# Patient Record
Sex: MALE | Race: WHITE | NOT HISPANIC OR LATINO | Employment: FULL TIME | URBAN - METROPOLITAN AREA
[De-identification: names, ages, dates, MRNs, and addresses within clinical notes are randomized per-mention and may not be internally consistent; named-entity substitution may affect disease eponyms.]

---

## 2021-01-08 ENCOUNTER — IMMUNIZATION (OUTPATIENT)
Dept: VACCINE CLINIC | Facility: HOSPITAL | Age: 21
End: 2021-01-08

## 2021-01-08 PROCEDURE — 91301 HC SARSCO02 VAC 100MCG/0.5ML IM: CPT | Performed by: OBSTETRICS & GYNECOLOGY

## 2021-01-08 PROCEDURE — 0011A: CPT | Performed by: OBSTETRICS & GYNECOLOGY

## 2021-02-08 ENCOUNTER — IMMUNIZATION (OUTPATIENT)
Dept: VACCINE CLINIC | Facility: HOSPITAL | Age: 21
End: 2021-02-08

## 2021-02-08 PROCEDURE — 91301 HC SARSCO02 VAC 100MCG/0.5ML IM: CPT | Performed by: OBSTETRICS & GYNECOLOGY

## 2021-02-08 PROCEDURE — 0012A: CPT | Performed by: OBSTETRICS & GYNECOLOGY

## 2021-12-09 ENCOUNTER — IMMUNIZATION (OUTPATIENT)
Dept: VACCINE CLINIC | Facility: HOSPITAL | Age: 21
End: 2021-12-09

## 2021-12-09 DIAGNOSIS — Z23 NEED FOR VACCINATION: Primary | ICD-10-CM

## 2021-12-09 PROCEDURE — 91306 HC SARSCOV2 VAC 50MCG/0.25ML IM: CPT | Performed by: OBSTETRICS & GYNECOLOGY

## 2021-12-09 PROCEDURE — 0064A HC ADM SARSCOV2 50MCG/0.25ML BOOSTER: CPT | Performed by: OBSTETRICS & GYNECOLOGY

## 2022-08-09 NOTE — PROGRESS NOTES
"SURGERY  Wild Herrera   2000  08/10/22    Chief Complaint: Hernia    HPI    Mr. Herrera is a very nice 22 y.o. male referred by Dr Roe in the ER with a left inguinal hernia.  He had gone to the urgent care for right testicular pain of about 1 month duration, onset coinciding with lifting a washer.  he does heavy lifting at his work for [employer].  During his examination and urgent care he actually vasovagal than sustained a lumbar bruise and was directed to the ER.  He went to [facility] ER where he was determined to have a reducible left inguinal hernia.  Testicular US was negative.  UA was negative as well.    Father with hernia as a child.      Today, he doesn't bring it up, nor is it on his ROS, but when questioned, he states he has rectal pain as well, but no bleeding and non sense of constipation.  He adds that it hurts to even get up out of the chair.  His mother notes he is a \"non-complainer\" and he states it is bad enough that he would like to consider not working.      Past Medical History:   Diagnosis Date   • Asthma NA    Mild Exercise induced asthma. Had issues with it playing sports in high school but havent had any issues since.     Past Surgical History:   Procedure Laterality Date   • CATARACT EXTRACTION     • KNEE SURGERY       Family History   Problem Relation Age of Onset   • Hypertension Mother    • Kidney disease Mother    • No Known Problems Father    • Depression Sister      Social History     Socioeconomic History   • Marital status: Single   Tobacco Use   • Smoking status: Never Smoker   • Smokeless tobacco: Never Used   Vaping Use   • Vaping Use: Never used   Substance and Sexual Activity   • Alcohol use: Yes     Comment: social    • Drug use: Never   • Sexual activity: Never       No current outpatient medications on file.    Allergies   Allergen Reactions   • Cefprozil Rash and Unknown (See Comments)     Review of Systems  No chest pain or SOB.  All other systems reviewed and " "negative.     PHYSICAL EXAM:    Ht 172.7 cm (68\")   Wt 79.2 kg (174 lb 8 oz)   BMI 26.53 kg/m²   Body mass index is 26.53 kg/m².    Constitutional: well developed, well nourished, appears  healthy, stated age  ENMT: Hearing intact, neck without masses  CVS: RRR, no murmur, no peripheral edema  Respiratory: CTA, normal respiratory effort   Gastrointestinal: abdomen soft, nontender, abdominal hernia not present  Genitourinary: inguinal hernia felt on the left with palpable protruding tissue with coughing that then recedes, a sense of a pulsation on the right without obvious hernia, very tender testicles posteriorly, tender cord up high bilaterlal  Musculoskeletal: gait normal, muscle mass normal  Neurological: awake and alert, seems to have reasonable capacity for understanding for medical decision making  Psychiatric: appears to have reasonable judgement, pleasant, quiet    Radiographic/Lab Findings:   US testicle    Reviewed: results from [facility] and pamphlet regarding surgery for hernia.    IMPRESSION:  · Right epididymitis seems possible despite negative US  · Left inguinal hernia  · Possible right inguinal hernia  · Testicular pain out of proportion to exam, associated with lifting.  Mother is a little concerned about needing additional evaluation since no clear etiology of pain which is fairly significant in a \"non-complainer\"  · Rectal pain    PLAN:  · CT pelvis to evaluate pain further  · Initiate tx for possible epididymitis.  cipro (instructed to hold if develops ligamentous pain) 10 days and advil  · Schedule lap left inguinal hernia repair, possible open.  Discussed risks of recurrence but mostly the almost certain post op testicular pain from irritation of the cord.  · If pain completely resolves with cipro and advil, may call and cancel surgery until hernia more symptomatic.   · Kefzol, oxytonin, celebrex, tylenol, SCD's in holding.  · Off work until surgery.     Veronica Danielle MD  14:56 EDT      In " order to provide a more personal and interactive patient experience as well as improve efficiency, this note was started prior to the office visit, including review of past history and pertinent images, surgeries.    ADDEND  CT normal.  I don't see clear evidence of hernias, and it was not read as such, but the inguinal canal looks a little billowy (thus could be hernias).  Proceed with plans as above.   Staff to notify.   Veronica Danielle MD  08/15/22

## 2022-08-09 NOTE — H&P (VIEW-ONLY)
"SURGERY  Wild Herrera   2000  08/10/22    Chief Complaint: Hernia    HPI    Mr. Herrera is a very nice 22 y.o. male referred by Dr Roe in the ER with a left inguinal hernia.  He had gone to the urgent care for right testicular pain of about 1 month duration, onset coinciding with lifting a washer.  he does heavy lifting at his work for [employer].  During his examination and urgent care he actually vasovagal than sustained a lumbar bruise and was directed to the ER.  He went to [facility] ER where he was determined to have a reducible left inguinal hernia.  Testicular US was negative.  UA was negative as well.    Father with hernia as a child.      Today, he doesn't bring it up, nor is it on his ROS, but when questioned, he states he has rectal pain as well, but no bleeding and non sense of constipation.  He adds that it hurts to even get up out of the chair.  His mother notes he is a \"non-complainer\" and he states it is bad enough that he would like to consider not working.      Past Medical History:   Diagnosis Date   • Asthma NA    Mild Exercise induced asthma. Had issues with it playing sports in high school but havent had any issues since.     Past Surgical History:   Procedure Laterality Date   • CATARACT EXTRACTION     • KNEE SURGERY       Family History   Problem Relation Age of Onset   • Hypertension Mother    • Kidney disease Mother    • No Known Problems Father    • Depression Sister      Social History     Socioeconomic History   • Marital status: Single   Tobacco Use   • Smoking status: Never Smoker   • Smokeless tobacco: Never Used   Vaping Use   • Vaping Use: Never used   Substance and Sexual Activity   • Alcohol use: Yes     Comment: social    • Drug use: Never   • Sexual activity: Never       No current outpatient medications on file.    Allergies   Allergen Reactions   • Cefprozil Rash and Unknown (See Comments)     Review of Systems  No chest pain or SOB.  All other systems reviewed and " "negative.     PHYSICAL EXAM:    Ht 172.7 cm (68\")   Wt 79.2 kg (174 lb 8 oz)   BMI 26.53 kg/m²   Body mass index is 26.53 kg/m².    Constitutional: well developed, well nourished, appears  healthy, stated age  ENMT: Hearing intact, neck without masses  CVS: RRR, no murmur, no peripheral edema  Respiratory: CTA, normal respiratory effort   Gastrointestinal: abdomen soft, nontender, abdominal hernia not present  Genitourinary: inguinal hernia felt on the left with palpable protruding tissue with coughing that then recedes, a sense of a pulsation on the right without obvious hernia, very tender testicles posteriorly, tender cord up high bilaterlal  Musculoskeletal: gait normal, muscle mass normal  Neurological: awake and alert, seems to have reasonable capacity for understanding for medical decision making  Psychiatric: appears to have reasonable judgement, pleasant, quiet    Radiographic/Lab Findings:   US testicle    Reviewed: results from [facility] and pamphlet regarding surgery for hernia.    IMPRESSION:  · Right epididymitis seems possible despite negative US  · Left inguinal hernia  · Possible right inguinal hernia  · Testicular pain out of proportion to exam, associated with lifting.  Mother is a little concerned about needing additional evaluation since no clear etiology of pain which is fairly significant in a \"non-complainer\"  · Rectal pain    PLAN:  · CT pelvis to evaluate pain further  · Initiate tx for possible epididymitis.  cipro (instructed to hold if develops ligamentous pain) 10 days and advil  · Schedule lap left inguinal hernia repair, possible open.  Discussed risks of recurrence but mostly the almost certain post op testicular pain from irritation of the cord.  · If pain completely resolves with cipro and advil, may call and cancel surgery until hernia more symptomatic.   · Kefzol, oxytonin, celebrex, tylenol, SCD's in holding.  · Off work until surgery.     Veronica Danielle MD  14:56 EDT      In " order to provide a more personal and interactive patient experience as well as improve efficiency, this note was started prior to the office visit, including review of past history and pertinent images, surgeries.    ADDEND  CT normal.  I don't see clear evidence of hernias, and it was not read as such, but the inguinal canal looks a little billowy (thus could be hernias).  Proceed with plans as above.   Staff to notify.   Veronica Danielle MD  08/15/22

## 2022-08-10 ENCOUNTER — PREP FOR SURGERY (OUTPATIENT)
Dept: OTHER | Facility: HOSPITAL | Age: 22
End: 2022-08-10

## 2022-08-10 ENCOUNTER — OFFICE VISIT (OUTPATIENT)
Dept: SURGERY | Facility: CLINIC | Age: 22
End: 2022-08-10

## 2022-08-10 VITALS — HEIGHT: 68 IN | BODY MASS INDEX: 26.45 KG/M2 | WEIGHT: 174.5 LBS

## 2022-08-10 DIAGNOSIS — K40.90 LEFT INGUINAL HERNIA: Primary | ICD-10-CM

## 2022-08-10 DIAGNOSIS — N45.1 EPIDIDYMITIS: ICD-10-CM

## 2022-08-10 PROCEDURE — 99203 OFFICE O/P NEW LOW 30 MIN: CPT | Performed by: SURGERY

## 2022-08-10 RX ORDER — CELECOXIB 200 MG/1
200 CAPSULE ORAL ONCE
Status: CANCELLED | OUTPATIENT
Start: 2022-08-24 | End: 2022-08-10

## 2022-08-10 RX ORDER — CLINDAMYCIN PHOSPHATE 900 MG/50ML
900 INJECTION INTRAVENOUS ONCE
Status: CANCELLED | OUTPATIENT
Start: 2022-08-24 | End: 2022-08-10

## 2022-08-10 RX ORDER — CIPROFLOXACIN 500 MG/1
500 TABLET, FILM COATED ORAL 2 TIMES DAILY
Qty: 20 TABLET | Refills: 0 | Status: SHIPPED | OUTPATIENT
Start: 2022-08-10 | End: 2022-08-24 | Stop reason: HOSPADM

## 2022-08-10 RX ORDER — ONDANSETRON 2 MG/ML
4 INJECTION INTRAMUSCULAR; INTRAVENOUS EVERY 6 HOURS PRN
Status: CANCELLED | OUTPATIENT
Start: 2022-08-10

## 2022-08-10 RX ORDER — OXYCODONE HCL 10 MG/1
10 TABLET, FILM COATED, EXTENDED RELEASE ORAL ONCE
Status: CANCELLED | OUTPATIENT
Start: 2022-08-24 | End: 2022-08-10

## 2022-08-10 RX ORDER — ACETAMINOPHEN 500 MG
1000 TABLET ORAL ONCE
Status: CANCELLED | OUTPATIENT
Start: 2022-08-24 | End: 2022-08-10

## 2022-08-12 ENCOUNTER — HOSPITAL ENCOUNTER (OUTPATIENT)
Dept: CT IMAGING | Facility: HOSPITAL | Age: 22
Discharge: HOME | End: 2022-08-12
Admitting: SURGERY

## 2022-08-12 DIAGNOSIS — K40.90 LEFT INGUINAL HERNIA: ICD-10-CM

## 2022-08-12 DIAGNOSIS — N45.1 EPIDIDYMITIS: ICD-10-CM

## 2022-08-12 PROCEDURE — 72193 CT PELVIS W/DYE: CPT

## 2022-08-12 PROCEDURE — 25010000002 IOPAMIDOL 61 % SOLUTION: Performed by: SURGERY

## 2022-08-12 RX ADMIN — IOPAMIDOL 100 ML: 612 INJECTION, SOLUTION INTRAVENOUS at 14:17

## 2022-08-15 ENCOUNTER — APPOINTMENT (OUTPATIENT)
Dept: CT IMAGING | Facility: HOSPITAL | Age: 22
End: 2022-08-15

## 2022-08-15 ENCOUNTER — TELEPHONE (OUTPATIENT)
Dept: SURGERY | Facility: CLINIC | Age: 22
End: 2022-08-15

## 2022-08-15 NOTE — TELEPHONE ENCOUNTER
OK PER [facility] VERBAL RELEASE    I left the patient a voicemail per Dr. Danielle with the results from his CT scan:  CT normal.  I don't see clear evidence of hernias, and it was not read as such, but the inguinal canal looks a little billowy (thus could be hernias).  Proceed with plans as above.   Staff to notify.   Veronica Danielle MD  08/15/22    Advised patient to call back with any questions.

## 2022-08-15 NOTE — TELEPHONE ENCOUNTER
----- Message from Veronica Danielle MD sent at 8/15/2022  7:11 AM EDT -----  Please note my addendum to most recent office visit and call to notify patient.

## 2022-08-23 ENCOUNTER — LAB (OUTPATIENT)
Dept: LAB | Facility: HOSPITAL | Age: 22
End: 2022-08-23

## 2022-08-23 DIAGNOSIS — K40.90 LEFT INGUINAL HERNIA: ICD-10-CM

## 2022-08-23 LAB — SARS-COV-2 ORF1AB RESP QL NAA+PROBE: NOT DETECTED

## 2022-08-23 PROCEDURE — C9803 HOPD COVID-19 SPEC COLLECT: HCPCS

## 2022-08-23 PROCEDURE — U0004 COV-19 TEST NON-CDC HGH THRU: HCPCS

## 2022-08-23 RX ORDER — IBUPROFEN 400 MG/1
400 TABLET ORAL EVERY 6 HOURS PRN
COMMUNITY
End: 2022-08-24 | Stop reason: HOSPADM

## 2022-08-24 ENCOUNTER — ANESTHESIA (OUTPATIENT)
Dept: PERIOP | Facility: HOSPITAL | Age: 22
End: 2022-08-24

## 2022-08-24 ENCOUNTER — HOSPITAL ENCOUNTER (OUTPATIENT)
Facility: HOSPITAL | Age: 22
Setting detail: HOSPITAL OUTPATIENT SURGERY
Discharge: HOME | End: 2022-08-24
Attending: SURGERY | Admitting: SURGERY

## 2022-08-24 ENCOUNTER — ANESTHESIA EVENT (OUTPATIENT)
Dept: PERIOP | Facility: HOSPITAL | Age: 22
End: 2022-08-24

## 2022-08-24 VITALS
HEART RATE: 80 BPM | TEMPERATURE: 98.3 F | OXYGEN SATURATION: 98 % | RESPIRATION RATE: 16 BRPM | SYSTOLIC BLOOD PRESSURE: 118 MMHG | DIASTOLIC BLOOD PRESSURE: 68 MMHG

## 2022-08-24 DIAGNOSIS — K40.90 LEFT INGUINAL HERNIA: ICD-10-CM

## 2022-08-24 PROCEDURE — 25010000002 HYDROMORPHONE PER 4 MG: Performed by: NURSE ANESTHETIST, CERTIFIED REGISTERED

## 2022-08-24 PROCEDURE — 49650 LAP ING HERNIA REPAIR INIT: CPT | Performed by: SURGERY

## 2022-08-24 PROCEDURE — 25010000002 ONDANSETRON PER 1 MG: Performed by: NURSE ANESTHETIST, CERTIFIED REGISTERED

## 2022-08-24 PROCEDURE — 25010000002 DEXAMETHASONE PER 1 MG: Performed by: NURSE ANESTHETIST, CERTIFIED REGISTERED

## 2022-08-24 PROCEDURE — 25010000002 FENTANYL CITRATE (PF) 50 MCG/ML SOLUTION: Performed by: NURSE ANESTHETIST, CERTIFIED REGISTERED

## 2022-08-24 PROCEDURE — 25010000002 KETOROLAC TROMETHAMINE PER 15 MG: Performed by: NURSE ANESTHETIST, CERTIFIED REGISTERED

## 2022-08-24 PROCEDURE — C1781 MESH (IMPLANTABLE): HCPCS | Performed by: SURGERY

## 2022-08-24 PROCEDURE — 25010000002 PROPOFOL 10 MG/ML EMULSION: Performed by: NURSE ANESTHETIST, CERTIFIED REGISTERED

## 2022-08-24 PROCEDURE — 49650 LAP ING HERNIA REPAIR INIT: CPT | Performed by: PHYSICIAN ASSISTANT

## 2022-08-24 PROCEDURE — 25010000002 NEOSTIGMINE 5 MG/10ML SOLUTION: Performed by: NURSE ANESTHETIST, CERTIFIED REGISTERED

## 2022-08-24 PROCEDURE — 25010000002 MIDAZOLAM PER 1 MG: Performed by: ANESTHESIOLOGY

## 2022-08-24 DEVICE — HEMOLOK ML 6 CLIPS/CART
Type: IMPLANTABLE DEVICE | Site: ABDOMEN | Status: FUNCTIONAL
Brand: WECK

## 2022-08-24 DEVICE — BARD 3DMAX MESH RIGHT LARGE
Type: IMPLANTABLE DEVICE | Site: ABDOMEN | Status: FUNCTIONAL
Brand: BARD 3DMAX MESH

## 2022-08-24 DEVICE — BARD 3DMAX MESH LEFT LARGE
Type: IMPLANTABLE DEVICE | Site: ABDOMEN | Status: FUNCTIONAL
Brand: BARD 3DMAX MESH

## 2022-08-24 DEVICE — FIXATION DEVICE;30 VIOLET ABSORBABLE TACKS
Type: IMPLANTABLE DEVICE | Site: ABDOMEN | Status: FUNCTIONAL
Brand: ABSORBATACK

## 2022-08-24 RX ORDER — HYDROMORPHONE HYDROCHLORIDE 1 MG/ML
0.5 INJECTION, SOLUTION INTRAMUSCULAR; INTRAVENOUS; SUBCUTANEOUS
Status: DISCONTINUED | OUTPATIENT
Start: 2022-08-24 | End: 2022-08-24 | Stop reason: HOSPADM

## 2022-08-24 RX ORDER — ONDANSETRON 2 MG/ML
INJECTION INTRAMUSCULAR; INTRAVENOUS AS NEEDED
Status: DISCONTINUED | OUTPATIENT
Start: 2022-08-24 | End: 2022-08-24 | Stop reason: SURG

## 2022-08-24 RX ORDER — HYDRALAZINE HYDROCHLORIDE 20 MG/ML
5 INJECTION INTRAMUSCULAR; INTRAVENOUS
Status: DISCONTINUED | OUTPATIENT
Start: 2022-08-24 | End: 2022-08-24 | Stop reason: HOSPADM

## 2022-08-24 RX ORDER — NALOXONE HCL 0.4 MG/ML
0.2 VIAL (ML) INJECTION AS NEEDED
Status: DISCONTINUED | OUTPATIENT
Start: 2022-08-24 | End: 2022-08-24 | Stop reason: HOSPADM

## 2022-08-24 RX ORDER — PROMETHAZINE HYDROCHLORIDE 25 MG/1
25 TABLET ORAL ONCE AS NEEDED
Status: DISCONTINUED | OUTPATIENT
Start: 2022-08-24 | End: 2022-08-24 | Stop reason: HOSPADM

## 2022-08-24 RX ORDER — PROMETHAZINE HYDROCHLORIDE 25 MG/1
25 SUPPOSITORY RECTAL ONCE AS NEEDED
Status: DISCONTINUED | OUTPATIENT
Start: 2022-08-24 | End: 2022-08-24 | Stop reason: HOSPADM

## 2022-08-24 RX ORDER — GLYCOPYRROLATE 0.2 MG/ML
INJECTION INTRAMUSCULAR; INTRAVENOUS AS NEEDED
Status: DISCONTINUED | OUTPATIENT
Start: 2022-08-24 | End: 2022-08-24 | Stop reason: SURG

## 2022-08-24 RX ORDER — SODIUM CHLORIDE 0.9 % (FLUSH) 0.9 %
3 SYRINGE (ML) INJECTION EVERY 12 HOURS SCHEDULED
Status: DISCONTINUED | OUTPATIENT
Start: 2022-08-24 | End: 2022-08-24 | Stop reason: HOSPADM

## 2022-08-24 RX ORDER — HYDROCODONE BITARTRATE AND ACETAMINOPHEN 7.5; 325 MG/1; MG/1
1 TABLET ORAL ONCE AS NEEDED
Status: COMPLETED | OUTPATIENT
Start: 2022-08-24 | End: 2022-08-24

## 2022-08-24 RX ORDER — LIDOCAINE HYDROCHLORIDE 10 MG/ML
0.5 INJECTION, SOLUTION EPIDURAL; INFILTRATION; INTRACAUDAL; PERINEURAL ONCE AS NEEDED
Status: DISCONTINUED | OUTPATIENT
Start: 2022-08-24 | End: 2022-08-24 | Stop reason: HOSPADM

## 2022-08-24 RX ORDER — SODIUM CHLORIDE 9 MG/ML
INJECTION, SOLUTION INTRAVENOUS AS NEEDED
Status: DISCONTINUED | OUTPATIENT
Start: 2022-08-24 | End: 2022-08-24 | Stop reason: HOSPADM

## 2022-08-24 RX ORDER — LIDOCAINE HYDROCHLORIDE 20 MG/ML
INJECTION, SOLUTION INFILTRATION; PERINEURAL AS NEEDED
Status: DISCONTINUED | OUTPATIENT
Start: 2022-08-24 | End: 2022-08-24 | Stop reason: SURG

## 2022-08-24 RX ORDER — DIPHENHYDRAMINE HYDROCHLORIDE 50 MG/ML
12.5 INJECTION INTRAMUSCULAR; INTRAVENOUS
Status: DISCONTINUED | OUTPATIENT
Start: 2022-08-24 | End: 2022-08-24 | Stop reason: HOSPADM

## 2022-08-24 RX ORDER — HYDROMORPHONE HCL 110MG/55ML
PATIENT CONTROLLED ANALGESIA SYRINGE INTRAVENOUS AS NEEDED
Status: DISCONTINUED | OUTPATIENT
Start: 2022-08-24 | End: 2022-08-24 | Stop reason: SURG

## 2022-08-24 RX ORDER — DIPHENHYDRAMINE HCL 25 MG
25 CAPSULE ORAL
Status: DISCONTINUED | OUTPATIENT
Start: 2022-08-24 | End: 2022-08-24 | Stop reason: HOSPADM

## 2022-08-24 RX ORDER — OXYCODONE AND ACETAMINOPHEN 7.5; 325 MG/1; MG/1
1 TABLET ORAL EVERY 4 HOURS PRN
Status: DISCONTINUED | OUTPATIENT
Start: 2022-08-24 | End: 2022-08-24 | Stop reason: HOSPADM

## 2022-08-24 RX ORDER — FENTANYL CITRATE 50 UG/ML
50 INJECTION, SOLUTION INTRAMUSCULAR; INTRAVENOUS
Status: DISCONTINUED | OUTPATIENT
Start: 2022-08-24 | End: 2022-08-24 | Stop reason: HOSPADM

## 2022-08-24 RX ORDER — OXYCODONE HCL 10 MG/1
10 TABLET, FILM COATED, EXTENDED RELEASE ORAL ONCE
Status: COMPLETED | OUTPATIENT
Start: 2022-08-24 | End: 2022-08-24

## 2022-08-24 RX ORDER — HYDROCODONE BITARTRATE AND ACETAMINOPHEN 5; 325 MG/1; MG/1
1 TABLET ORAL EVERY 4 HOURS PRN
Qty: 10 TABLET | Refills: 0 | Status: SHIPPED | OUTPATIENT
Start: 2022-08-24 | End: 2022-09-12

## 2022-08-24 RX ORDER — ROCURONIUM BROMIDE 10 MG/ML
INJECTION, SOLUTION INTRAVENOUS AS NEEDED
Status: DISCONTINUED | OUTPATIENT
Start: 2022-08-24 | End: 2022-08-24 | Stop reason: SURG

## 2022-08-24 RX ORDER — FAMOTIDINE 10 MG/ML
20 INJECTION, SOLUTION INTRAVENOUS ONCE
Status: COMPLETED | OUTPATIENT
Start: 2022-08-24 | End: 2022-08-24

## 2022-08-24 RX ORDER — FLUMAZENIL 0.1 MG/ML
0.2 INJECTION INTRAVENOUS AS NEEDED
Status: DISCONTINUED | OUTPATIENT
Start: 2022-08-24 | End: 2022-08-24 | Stop reason: HOSPADM

## 2022-08-24 RX ORDER — EPHEDRINE SULFATE 50 MG/ML
5 INJECTION, SOLUTION INTRAVENOUS ONCE AS NEEDED
Status: DISCONTINUED | OUTPATIENT
Start: 2022-08-24 | End: 2022-08-24 | Stop reason: HOSPADM

## 2022-08-24 RX ORDER — IBUPROFEN 600 MG/1
600 TABLET ORAL ONCE AS NEEDED
Status: DISCONTINUED | OUTPATIENT
Start: 2022-08-24 | End: 2022-08-24 | Stop reason: HOSPADM

## 2022-08-24 RX ORDER — FENTANYL CITRATE 50 UG/ML
INJECTION, SOLUTION INTRAMUSCULAR; INTRAVENOUS AS NEEDED
Status: DISCONTINUED | OUTPATIENT
Start: 2022-08-24 | End: 2022-08-24 | Stop reason: SURG

## 2022-08-24 RX ORDER — LABETALOL HYDROCHLORIDE 5 MG/ML
5 INJECTION, SOLUTION INTRAVENOUS
Status: DISCONTINUED | OUTPATIENT
Start: 2022-08-24 | End: 2022-08-24 | Stop reason: HOSPADM

## 2022-08-24 RX ORDER — SODIUM CHLORIDE 0.9 % (FLUSH) 0.9 %
3-10 SYRINGE (ML) INJECTION AS NEEDED
Status: DISCONTINUED | OUTPATIENT
Start: 2022-08-24 | End: 2022-08-24 | Stop reason: HOSPADM

## 2022-08-24 RX ORDER — CLINDAMYCIN PHOSPHATE 900 MG/50ML
900 INJECTION INTRAVENOUS ONCE
Status: COMPLETED | OUTPATIENT
Start: 2022-08-24 | End: 2022-08-24

## 2022-08-24 RX ORDER — SODIUM CHLORIDE, SODIUM LACTATE, POTASSIUM CHLORIDE, CALCIUM CHLORIDE 600; 310; 30; 20 MG/100ML; MG/100ML; MG/100ML; MG/100ML
9 INJECTION, SOLUTION INTRAVENOUS CONTINUOUS
Status: DISCONTINUED | OUTPATIENT
Start: 2022-08-24 | End: 2022-08-24 | Stop reason: HOSPADM

## 2022-08-24 RX ORDER — PROPOFOL 10 MG/ML
VIAL (ML) INTRAVENOUS AS NEEDED
Status: DISCONTINUED | OUTPATIENT
Start: 2022-08-24 | End: 2022-08-24 | Stop reason: SURG

## 2022-08-24 RX ORDER — CELECOXIB 200 MG/1
200 CAPSULE ORAL ONCE
Status: COMPLETED | OUTPATIENT
Start: 2022-08-24 | End: 2022-08-24

## 2022-08-24 RX ORDER — BUPIVACAINE HYDROCHLORIDE AND EPINEPHRINE 5; 5 MG/ML; UG/ML
INJECTION, SOLUTION EPIDURAL; INTRACAUDAL; PERINEURAL AS NEEDED
Status: DISCONTINUED | OUTPATIENT
Start: 2022-08-24 | End: 2022-08-24 | Stop reason: HOSPADM

## 2022-08-24 RX ORDER — ONDANSETRON 2 MG/ML
4 INJECTION INTRAMUSCULAR; INTRAVENOUS EVERY 6 HOURS PRN
Status: DISCONTINUED | OUTPATIENT
Start: 2022-08-24 | End: 2022-08-24 | Stop reason: HOSPADM

## 2022-08-24 RX ORDER — ONDANSETRON 4 MG/1
4 TABLET, FILM COATED ORAL EVERY 8 HOURS PRN
Qty: 10 TABLET | Refills: 0 | Status: SHIPPED | OUTPATIENT
Start: 2022-08-24 | End: 2022-09-12

## 2022-08-24 RX ORDER — ONDANSETRON 2 MG/ML
4 INJECTION INTRAMUSCULAR; INTRAVENOUS ONCE AS NEEDED
Status: DISCONTINUED | OUTPATIENT
Start: 2022-08-24 | End: 2022-08-24 | Stop reason: HOSPADM

## 2022-08-24 RX ORDER — MIDAZOLAM HYDROCHLORIDE 1 MG/ML
1 INJECTION INTRAMUSCULAR; INTRAVENOUS
Status: DISCONTINUED | OUTPATIENT
Start: 2022-08-24 | End: 2022-08-24 | Stop reason: HOSPADM

## 2022-08-24 RX ORDER — ACETAMINOPHEN 500 MG
1000 TABLET ORAL ONCE
Status: COMPLETED | OUTPATIENT
Start: 2022-08-24 | End: 2022-08-24

## 2022-08-24 RX ORDER — NEOSTIGMINE METHYLSULFATE 0.5 MG/ML
INJECTION, SOLUTION INTRAVENOUS AS NEEDED
Status: DISCONTINUED | OUTPATIENT
Start: 2022-08-24 | End: 2022-08-24 | Stop reason: SURG

## 2022-08-24 RX ORDER — KETOROLAC TROMETHAMINE 30 MG/ML
INJECTION, SOLUTION INTRAMUSCULAR; INTRAVENOUS AS NEEDED
Status: DISCONTINUED | OUTPATIENT
Start: 2022-08-24 | End: 2022-08-24 | Stop reason: SURG

## 2022-08-24 RX ORDER — DEXAMETHASONE SODIUM PHOSPHATE 10 MG/ML
INJECTION INTRAMUSCULAR; INTRAVENOUS AS NEEDED
Status: DISCONTINUED | OUTPATIENT
Start: 2022-08-24 | End: 2022-08-24 | Stop reason: SURG

## 2022-08-24 RX ADMIN — HYDROCODONE BITARTRATE AND ACETAMINOPHEN 1 TABLET: 7.5; 325 TABLET ORAL at 09:54

## 2022-08-24 RX ADMIN — HYDROMORPHONE HYDROCHLORIDE 0.5 MG: 2 INJECTION, SOLUTION INTRAMUSCULAR; INTRAVENOUS; SUBCUTANEOUS at 09:12

## 2022-08-24 RX ADMIN — SODIUM CHLORIDE, POTASSIUM CHLORIDE, SODIUM LACTATE AND CALCIUM CHLORIDE: 600; 310; 30; 20 INJECTION, SOLUTION INTRAVENOUS at 09:10

## 2022-08-24 RX ADMIN — FENTANYL CITRATE 50 MCG: 50 INJECTION INTRAMUSCULAR; INTRAVENOUS at 08:01

## 2022-08-24 RX ADMIN — GLYCOPYRROLATE 0.1 MG: 0.2 INJECTION INTRAMUSCULAR; INTRAVENOUS at 08:01

## 2022-08-24 RX ADMIN — FENTANYL CITRATE 50 MCG: 50 INJECTION INTRAMUSCULAR; INTRAVENOUS at 09:54

## 2022-08-24 RX ADMIN — CELECOXIB 200 MG: 200 CAPSULE ORAL at 06:51

## 2022-08-24 RX ADMIN — HYDROMORPHONE HYDROCHLORIDE 0.5 MG: 1 INJECTION, SOLUTION INTRAMUSCULAR; INTRAVENOUS; SUBCUTANEOUS at 09:56

## 2022-08-24 RX ADMIN — ACETAMINOPHEN 1000 MG: 500 TABLET, FILM COATED ORAL at 06:51

## 2022-08-24 RX ADMIN — KETOROLAC TROMETHAMINE 30 MG: 30 INJECTION, SOLUTION INTRAMUSCULAR at 09:10

## 2022-08-24 RX ADMIN — MIDAZOLAM 1 MG: 1 INJECTION INTRAMUSCULAR; INTRAVENOUS at 07:07

## 2022-08-24 RX ADMIN — CLINDAMYCIN PHOSPHATE 900 MG: 900 INJECTION, SOLUTION INTRAVENOUS at 07:51

## 2022-08-24 RX ADMIN — FENTANYL CITRATE 50 MCG: 50 INJECTION INTRAMUSCULAR; INTRAVENOUS at 08:23

## 2022-08-24 RX ADMIN — LIDOCAINE HYDROCHLORIDE 100 MG: 20 INJECTION, SOLUTION INFILTRATION; PERINEURAL at 08:01

## 2022-08-24 RX ADMIN — NEOSTIGMINE METHYLSULFATE 4 MG: 0.5 INJECTION INTRAVENOUS at 09:10

## 2022-08-24 RX ADMIN — SODIUM CHLORIDE, POTASSIUM CHLORIDE, SODIUM LACTATE AND CALCIUM CHLORIDE 9 ML/HR: 600; 310; 30; 20 INJECTION, SOLUTION INTRAVENOUS at 07:01

## 2022-08-24 RX ADMIN — ROCURONIUM BROMIDE 10 MG: 50 INJECTION INTRAVENOUS at 08:46

## 2022-08-24 RX ADMIN — DEXAMETHASONE SODIUM PHOSPHATE 8 MG: 10 INJECTION INTRAMUSCULAR; INTRAVENOUS at 08:01

## 2022-08-24 RX ADMIN — OXYCODONE HYDROCHLORIDE 10 MG: 10 TABLET, FILM COATED, EXTENDED RELEASE ORAL at 06:51

## 2022-08-24 RX ADMIN — GLYCOPYRROLATE 0.6 MG: 0.2 INJECTION INTRAMUSCULAR; INTRAVENOUS at 09:10

## 2022-08-24 RX ADMIN — PROPOFOL 200 MG: 10 INJECTION, EMULSION INTRAVENOUS at 08:01

## 2022-08-24 RX ADMIN — ROCURONIUM BROMIDE 50 MG: 50 INJECTION INTRAVENOUS at 08:01

## 2022-08-24 RX ADMIN — ONDANSETRON 4 MG: 2 INJECTION INTRAMUSCULAR; INTRAVENOUS at 09:07

## 2022-08-24 RX ADMIN — FAMOTIDINE 20 MG: 10 INJECTION, SOLUTION INTRAVENOUS at 06:51

## 2022-08-24 NOTE — ANESTHESIA POSTPROCEDURE EVALUATION
Patient: Wild Herrera    Procedure Summary     Date: 08/24/22 Room / Location: [facility] OR 02 / [facility] MAIN OR    Anesthesia Start: 0754 Anesthesia Stop: 0930    Procedure: BILATERAL INGUINAL HERNIA REPAIR LAPAROSCOPIC (Left Abdomen) Diagnosis:       Left inguinal hernia      (Left inguinal hernia [K40.90])    Surgeons: Veronica Danielle MD Provider: Luis Daniel Montes MD    Anesthesia Type: general ASA Status: 2          Anesthesia Type: general    Vitals  Vitals Value Taken Time   /77 08/24/22 1031   Temp 36.8 °C (98.3 °F) 08/24/22 1030   Pulse 82 08/24/22 1032   Resp 14 08/24/22 1030   SpO2 97 % 08/24/22 1032   Vitals shown include unvalidated device data.        Post Anesthesia Care and Evaluation    Patient location during evaluation: PACU  Patient participation: complete - patient participated  Level of consciousness: lethargic  Pain management: adequate    Airway patency: patent  Anesthetic complications: No anesthetic complications    Cardiovascular status: acceptable  Respiratory status: acceptable  Hydration status: acceptable    Comments: --------------------            08/24/22               1040     --------------------   BP:       123/71     Pulse:      82       Resp:                Temp:                SpO2:      98%      --------------------

## 2022-08-24 NOTE — ANESTHESIA PROCEDURE NOTES
Airway  Urgency: elective    Date/Time: 8/24/2022 8:04 AM  Airway not difficult    General Information and Staff    Patient location during procedure: OR  Anesthesiologist: Luis Daniel Montes MD  CRNA/CAA: Lurdes Adkins CRNA    Indications and Patient Condition  Indications for airway management: airway protection    Preoxygenated: yes  Mask difficulty assessment: 2 - vent by mask + OA or adjuvant +/- NMBA    Final Airway Details  Final airway type: endotracheal airway      Successful airway: ETT  Cuffed: yes   Successful intubation technique: direct laryngoscopy  Endotracheal tube insertion site: oral  Blade: Abdias  Blade size: 4  ETT size (mm): 7.5  Cormack-Lehane Classification: grade IIa - partial view of glottis  Placement verified by: chest auscultation and capnometry   Cuff volume (mL): 6  Measured from: teeth  ETT/EBT  to teeth (cm): 23  Number of attempts at approach: 1  Assessment: lips, teeth, and gum same as pre-op and atraumatic intubation

## 2022-08-24 NOTE — ANESTHESIA PREPROCEDURE EVALUATION
Anesthesia Evaluation     Patient summary reviewed and Nursing notes reviewed                Airway   Mallampati: II  TM distance: >3 FB  Neck ROM: full  Dental      Pulmonary - negative pulmonary ROS   Cardiovascular - negative cardio ROS    Rhythm: regular  Rate: normal        Neuro/Psych- negative ROS  GI/Hepatic/Renal/Endo - negative ROS     Musculoskeletal (-) negative ROS    Abdominal    Substance History - negative use     OB/GYN negative ob/gyn ROS         Other                        Anesthesia Plan    ASA 2     general     (I have reviewed the patient's history with the patient and the chart, including all pertinent laboratory results and imaging. I have explained the risks of anesthesia including but not limited to dental damage, corneal abrasion, nerve injury, MI, stroke, and death. Questions asked and answered. Anesthetic plan discussed with patient and team as indicated. Patient expressed understanding of the above.  )  intravenous induction     Anesthetic plan, risks, benefits, and alternatives have been provided, discussed and informed consent has been obtained with: patient.        CODE STATUS:

## 2022-08-24 NOTE — OP NOTE
"SURGERY  Operative Note :  SHASHI  Hernia Repair    Wild Herrera  2000    Procedure Date: 08/24/22    Pre-op Diagnosis:   · Left inguinal hernia  · Possible right inguinal hernia    Post-op Diagnosis:  · Same    Procedure:   · Laparoscopic bilateral inguinal hernia repair, total extraperitoneal approach, with mesh    Surgeon: Shashi    Assistant: Joshua    Indications:  · As above    Associated Issues:  · Testicular pain out of proportion to exam, associated with lifting.  Mother is a little concerned about needing additional evaluation since no clear etiology of pain which is fairly significant in a \"non-complainer\"  · Rectal pain, resolved  · Right epididymitis seems possible despite negative US, improved with abx.    Findings:   · Lipoma of cord bilateral, right greater than left, small indirect inguinal bilateral    Recommendations:   · Observe over the next week and if continues with scrotal pain, give another round of abx    EBL: minimal    Specimens:   · none    Technique:     Gen. anesthetic was induced, IV abx ([id number]) given, abdomen prepped with Hibiclens and draped sterilely.  There was no Monique catheter placed.  SCD garments were in place.    Incision was made below the umbilicus, transversely, with an 11 blade, beginning at the midline and extending to the left side.  S retractors were used to dissect through the subcutaneous fatty tissue to the fascia, which was then opened with an 11 blade from the midline out laterally.  S retractors were placed in order to pull the muscle out laterally from the midline, and then the second S retractor placed posterior to the muscle and used to slide down into the extraperitoneal space to begin dissection in that plain.    Then the moistened extrawide autosuture dissecting balloon was placed thru that opening, in the medial aspect, posterior to the muscle in the extraperitoneal space down to the pubic bone.  It was then insufflated under direct vision. " " There was  visualization of the epigastric vessels coursing anteriorly,   recognition of the pubic tubercle and the pelvic bone.  There was little bleeding.  Dissection carried out by the balloon was godd on the left and extended to the right a little.    We then exchanged the dissecting balloon for a 10 mm balloon trocar, and insufflated the space with 12 mmHg.  Two additional trocars were placed below that in the midline and off to the right, 5 mm each.    We began dissection at the pubic tubercle and extended out laterally, using the blunt dissectors and this was straightforward.   There was not a direct component.  Dissection of the peritoneal reflection was tedious at the vas.  The junction of the epigastric vessels and the iliac vessels was cleared.  The pelvic space ultimately was very well delineated on the left with the lipoma reduced.  hemolocks were placed on the peritoneum where the peritoneum tore    I dissected the right and found a lipoma as well, adherent peritoneum which tore as well requiring hemolocks..    3-D contour mesh was selected, the large size, for both sides and placed through the 10 mm trocar site, and oriented.  The mesh was then secured at the pubic tubercle and out laterally, with 6 Absorba Tacks along the pelvic bone.  Laterally, along the musculature, 2 additional tacs were placed, taking care to make sure that the peritoneum was pulled superior and lateral to all edges of the mesh.  My intent is to have a \"hammock\" type arrangement of the mesh, to cradle the bowel contents such that the anterior aspect would cover the defect and the more lateral and posterior aspect prevent the bowel and retroperitoneum from creeping under the mesh.  The mesh in this case laid very nicely, well tucked up under the peritoneum laterally and lipomas reduced medially.  The mesh was watched as we released the air in the extraperitoneal space to ensure that it retained the desired orientation.    All " trocars were removed.  There was not abdominal pneumoperitoneum and thus no angiocath was used thru the fascia posterior to the infraumbilical trochar site for decompression.  The 10 mm rectus fascial opening was closed with a 0 Vicryl figure-of-eight, and the skin with 3-0 Vicryl at the dermis at all sites, and 5-0 Vicryl's at the skin.  Exofin.    Veronica Danielle MD    09:20 EDT      Assistant was responsible for performing the following activities: Retraction, Suturing, Closing and Held/Positioned Camera and their skilled assistance was necessary for the success of this case.

## 2022-08-24 NOTE — INTERVAL H&P NOTE
H&P updated. The patient was examined and the following changes are noted:  pain improved with abx but not gone, suggestive of epididymitis.  May need second course of abx if not improved after surgery.

## 2022-09-01 NOTE — PROGRESS NOTES
SURGERY: LILY  Post op Visit  Wild Herrera  09/12/2022    Mr. Herrera  presents today after having undergone Surgery 8/24/22, of a laparoscopic bilateral inguinal hernia repair, extraperitoneal, with mesh.  This was for a lipoma of the cord, bilateral, right greater than left with small indirect inguinal hernia.  He was treated pre op with NSAID's and cipro for epididymitis with improvement but not complete resolution of symptoms.  Plan was to give a second round if all symptoms did not dickson after surgery.    Today, he comes in, having started his second round of cipro last night.  Encouraged NSAID's as well.  If he has recurring symptoms, should see a Urologist.  Incisions are healing well.  Less pain at hernia sites.  Findings discussed.  i'll see prn.  He returned to work early and we'll release today, as of last Tuesday.     Veronica Danielle MD  08:37 EDT

## 2022-09-07 ENCOUNTER — TELEPHONE (OUTPATIENT)
Dept: SURGERY | Facility: CLINIC | Age: 22
End: 2022-09-07

## 2022-09-08 RX ORDER — CIPROFLOXACIN 500 MG/1
500 TABLET, FILM COATED ORAL 2 TIMES DAILY
Qty: 28 TABLET | Refills: 0 | Status: SHIPPED | OUTPATIENT
Start: 2022-09-08 | End: 2022-11-15 | Stop reason: ALTCHOICE

## 2022-09-12 ENCOUNTER — OFFICE VISIT (OUTPATIENT)
Dept: SURGERY | Facility: CLINIC | Age: 22
End: 2022-09-12

## 2022-09-12 DIAGNOSIS — K40.90 LEFT INGUINAL HERNIA: Primary | ICD-10-CM

## 2022-09-12 PROCEDURE — 99024 POSTOP FOLLOW-UP VISIT: CPT | Performed by: SURGERY

## 2022-09-14 ENCOUNTER — OFFICE VISIT (OUTPATIENT)
Dept: FAMILY MEDICINE CLINIC | Facility: CLINIC | Age: 22
End: 2022-09-14

## 2022-09-14 VITALS
OXYGEN SATURATION: 96 % | TEMPERATURE: 96.9 F | WEIGHT: 177.8 LBS | HEIGHT: 68 IN | BODY MASS INDEX: 26.95 KG/M2 | HEART RATE: 97 BPM | SYSTOLIC BLOOD PRESSURE: 122 MMHG | DIASTOLIC BLOOD PRESSURE: 78 MMHG

## 2022-09-14 DIAGNOSIS — Z00.00 ANNUAL PHYSICAL EXAM: Primary | ICD-10-CM

## 2022-09-14 DIAGNOSIS — F32.A DEPRESSION, UNSPECIFIED DEPRESSION TYPE: ICD-10-CM

## 2022-09-14 PROBLEM — H04.123 DRY EYE SYNDROME OF BOTH EYES: Status: ACTIVE | Noted: 2021-10-08

## 2022-09-14 PROBLEM — H52.31 ANISOMETROPIA: Status: ACTIVE | Noted: 2021-10-08

## 2022-09-14 PROBLEM — Q12.0 CONGENITAL CATARACT OF LEFT EYE: Status: ACTIVE | Noted: 2021-10-08

## 2022-09-14 PROBLEM — H26.491 AFTER CATARACT OF RIGHT EYE NOT OBSCURING VISION: Status: ACTIVE | Noted: 2021-10-08

## 2022-09-14 PROBLEM — Z96.1 PSEUDOPHAKIA OF RIGHT EYE: Status: ACTIVE | Noted: 2021-10-08

## 2022-09-14 PROBLEM — S83.289A TEAR OF LATERAL MENISCUS OF KNEE: Status: ACTIVE | Noted: 2022-09-14

## 2022-09-14 PROBLEM — S83.249A TEAR OF MEDIAL MENISCUS OF KNEE: Status: ACTIVE | Noted: 2022-09-14

## 2022-09-14 PROBLEM — H52.13 MYOPIA OF BOTH EYES: Status: ACTIVE | Noted: 2021-10-08

## 2022-09-14 PROBLEM — H52.222 REGULAR ASTIGMATISM OF LEFT EYE: Status: ACTIVE | Noted: 2021-10-08

## 2022-09-14 PROBLEM — S83.519A RUPTURE OF ANTERIOR CRUCIATE LIGAMENT OF KNEE: Status: ACTIVE | Noted: 2022-09-14

## 2022-09-14 PROCEDURE — 99385 PREV VISIT NEW AGE 18-39: CPT | Performed by: STUDENT IN AN ORGANIZED HEALTH CARE EDUCATION/TRAINING PROGRAM

## 2022-09-14 RX ORDER — CITALOPRAM 10 MG/1
20 TABLET ORAL DAILY
Qty: 60 TABLET | Refills: 2 | Status: SHIPPED | OUTPATIENT
Start: 2022-09-14 | End: 2022-09-15

## 2022-09-14 NOTE — PROGRESS NOTES
"Chief Complaint  Establish Care and Depression    Subjective        Wild Herrera presents to [facility]  History of Present Illness  Wild presents to establish care today.  He has not had a primary care physician since he was following with the pediatrician as a teenager.  Completed hernia surgery 3 weeks ago and continues to follow with the surgeon.  He was recently diagnosed with epididymitis and started on Cipro by the surgeon.  Otherwise patient has been healthy and doing well physically.    Patient's main concern today is depression/anxiety.  He feels hopeless and worried about the future.  Symptoms started about 2 months ago when a 2-month long relationship ended.  He has depression symptoms related to sleep, interest, guilt, energy, concentration, suicidal thoughts.  He denies thoughts of hurting others.  He denies having a plan for suicide and he would talk with either his mother or one of his friends before he acted on his thoughts.  He also reports anxiety symptoms of being easily fatigued, having difficulty with concentration, and difficulty falling asleep and staying asleep.  He has been doing online therapy sessions and has also been seeing a psychologist, Natasha Garrett.  He has a follow-up with his psychologist next week.    Growing up Wild did well in school and went on to graduate from [employer] with an IT degree.  His parents were  when he was in third grade and both remarried.  He shuttled between both parents houses.  He has an older stepsister and a younger sister.  He currently lives at home with his mother and his younger sister.  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.  He has no history of illicit drug use, alcohol use, or smoking.  He is not currently exercising and has not involved in outside activities..      Objective   Vital Signs:  /78   Pulse 97   Temp 96.9 °F (36.1 °C)   Ht 172.7 cm (68\")   Wt 80.6 kg (177 lb 12.8 oz)   " "SpO2 96%   BMI 27.03 kg/m²   Estimated body mass index is 27.03 kg/m² as calculated from the following:    Height as of this encounter: 172.7 cm (68\").    Weight as of this encounter: 80.6 kg (177 lb 12.8 oz).          Physical Exam  Vitals reviewed.   Constitutional:       General: He is not in acute distress.     Appearance: Normal appearance.   HENT:      Head: Normocephalic and atraumatic.   Eyes:      Extraocular Movements: Extraocular movements intact.      Conjunctiva/sclera: Conjunctivae normal.   Cardiovascular:      Rate and Rhythm: Normal rate and regular rhythm.      Heart sounds: Normal heart sounds. No murmur heard.    No friction rub. No gallop.   Pulmonary:      Effort: Pulmonary effort is normal.      Breath sounds: Normal breath sounds. No wheezing, rhonchi or rales.   Abdominal:      General: Bowel sounds are normal.   Musculoskeletal:      Right lower leg: No edema.      Left lower leg: No edema.   Skin:         Neurological:      General: No focal deficit present.      Mental Status: He is alert. Mental status is at baseline.   Psychiatric:         Attention and Perception: Attention and perception normal.         Mood and Affect: Mood normal. Affect is flat.         Speech: Speech normal.         Behavior: Behavior normal. Behavior is cooperative.         Thought Content: Thought content includes suicidal (suicidal thoughts without plan or immediate intent) ideation. Thought content does not include homicidal ideation. Thought content does not include homicidal or suicidal plan.        Result Review :                Assessment and Plan   Diagnoses and all orders for this visit:    1. Annual physical exam (Primary)    2. Depression, unspecified depression type  -     Comprehensive Metabolic Panel  -     TSH Rfx On Abnormal To Free T4    Other orders  -     citalopram (CeleXA) 10 MG tablet; Take 2 tablets by mouth Daily for 30 days.  Dispense: 60 tablet; Refill: 2    Patient is a new patient and " is doing well physically.  He is only 2 weeks out from hernia repair and is currently being treated for epididymitis by his surgeon.  He does not have a background of psych issues and has recently developed depression with some associated anxiety after relationship break-up.  He has seen a counselor and was encouraged to continue to do that.  Discussed about thinking about how he wants to feel and trying to act that way because feelings often follow her actions.  Discussed ways to expand his social/support network.  He is currently in a stable living arrangement with his mother and younger sister.  He is also on a stable job.  Discussed who he would call if he begins feeling more suicidal or feels like he may act on his suicidal thoughts.  He currently denies having a suicide plan.  Beginning Celexa to help him with his current depression symptoms.  Acute baseline labs today including TSH levels.  We will follow-up in 2 months.         Follow Up   Return in about 2 months (around 11/14/2022) for Depression/anxiety.  Patient was given instructions and counseling regarding his condition or for health maintenance advice. Please see specific information pulled into the AVS if appropriate.

## 2022-09-14 NOTE — PATIENT INSTRUCTIONS
Continue seeing her counselor.  Start your Celexa with 1 tablet daily for the first 2 weeks.  If you do not feel like it is helping you may increase to 2 tablets daily.  Follow-up with us in 2 months.  If you have any concerns before then feel free to call or clinic or schedule an earlier appointment.

## 2022-09-15 ENCOUNTER — TELEPHONE (OUTPATIENT)
Dept: FAMILY MEDICINE CLINIC | Facility: CLINIC | Age: 22
End: 2022-09-15

## 2022-09-15 LAB
ALBUMIN SERPL-MCNC: 4.8 G/DL (ref 4.1–5.2)
ALBUMIN/GLOB SERPL: 2.2 {RATIO} (ref 1.2–2.2)
ALP SERPL-CCNC: 58 IU/L (ref 44–121)
ALT SERPL-CCNC: 21 IU/L (ref 0–44)
AST SERPL-CCNC: 15 IU/L (ref 0–40)
BILIRUB SERPL-MCNC: 1.2 MG/DL (ref 0–1.2)
BUN SERPL-MCNC: 15 MG/DL (ref 6–20)
BUN/CREAT SERPL: 15 (ref 9–20)
CALCIUM SERPL-MCNC: 9.6 MG/DL (ref 8.7–10.2)
CHLORIDE SERPL-SCNC: 101 MMOL/L (ref 96–106)
CO2 SERPL-SCNC: 22 MMOL/L (ref 20–29)
CREAT SERPL-MCNC: 1.03 MG/DL (ref 0.76–1.27)
EGFRCR-CYS SERPLBLD CKD-EPI 2021: 104 ML/MIN/1.73
GLOBULIN SER CALC-MCNC: 2.2 G/DL (ref 1.5–4.5)
GLUCOSE SERPL-MCNC: 85 MG/DL (ref 65–99)
POTASSIUM SERPL-SCNC: 4.4 MMOL/L (ref 3.5–5.2)
PROT SERPL-MCNC: 7 G/DL (ref 6–8.5)
SODIUM SERPL-SCNC: 139 MMOL/L (ref 134–144)
TSH SERPL DL<=0.005 MIU/L-ACNC: 0.96 UIU/ML (ref 0.45–4.5)

## 2022-09-15 RX ORDER — CITALOPRAM 20 MG/1
TABLET ORAL
Qty: 30 TABLET | Refills: 2 | Status: SHIPPED | OUTPATIENT
Start: 2022-09-15 | End: 2022-10-18

## 2022-09-15 NOTE — TELEPHONE ENCOUNTER
Caller: Wild Herrera    Relationship: Self    Best call back number: [phone]    Who are you requesting to speak with (clinical staff, provider,  specific staff member): CLINICAL STAFF     What was the call regarding:THE MEDICATION citalopram (CeleXA) 10 MG tablet REQUIRES A PA WANTING TO NOW IF THERE IS A MEDICATION HE CAN HAVE THAT WONT REQUIRE A PA     Do you require a callback: YES

## 2022-09-15 NOTE — TELEPHONE ENCOUNTER
SPOKE TO PATIENT AND INFORMED HIM THAT HE MAY HAVE TO CALL INSURANCE COMPANY TO SEE WHAT IS COVERED WITHOUT A PA.  INFORMED HIM I WOULD ASK YOU FIRST IF THERE WAS SOMETHING ELSE YOU COULD SEND IN THAT WAS SIMILAR AND INEXPENSIVE IN THIS GROUP OF MEDICATONS

## 2022-09-15 NOTE — TELEPHONE ENCOUNTER
Please notify patient that we have updated his prescription to 1 that his insurance should cover.  Thank you.

## 2022-09-19 ENCOUNTER — PATIENT ROUNDING (BHMG ONLY) (OUTPATIENT)
Dept: FAMILY MEDICINE CLINIC | Facility: CLINIC | Age: 22
End: 2022-09-19

## 2022-09-19 NOTE — PROGRESS NOTES
Sent Patient following in Rev Message:  My name is Ld Lewis      I am the Practice Manager with   [facility] [address]  [facility] [address] [phone].      I am messaging to officially welcome you to our practice and ask about your recent visit.     Tell me about your visit with us. What things went well?         We're always looking for ways to make our patients' experiences even better. Do you have recommendations on ways we may improve?       Overall were you satisfied with your first visit to our practice?        Is there anything else I can do for you?       Thank you, and have a great day.

## 2022-09-23 ENCOUNTER — TELEPHONE (OUTPATIENT)
Dept: FAMILY MEDICINE CLINIC | Facility: CLINIC | Age: 22
End: 2022-09-23

## 2022-09-23 RX ORDER — HYDROXYZINE 50 MG/1
50 TABLET, FILM COATED ORAL 3 TIMES DAILY PRN
Qty: 60 TABLET | Refills: 1 | Status: SHIPPED | OUTPATIENT
Start: 2022-09-23 | End: 2022-11-15 | Stop reason: ALTCHOICE

## 2022-09-23 NOTE — TELEPHONE ENCOUNTER
Caller: Wild Herrera    Relationship: Self    Best call back number: [phone]    What medications are you currently taking:   Current Outpatient Medications on File Prior to Visit   Medication Sig Dispense Refill   • ciprofloxacin (Cipro) 500 MG tablet Take 1 tablet by mouth 2 (Two) Times a Day. 28 tablet 0   • citalopram (CeleXA) 20 MG tablet Take 0.5 tablets by mouth Daily for 7 days, THEN 1 tablet Daily for 80 days. 30 tablet 2     No current facility-administered medications on file prior to visit.          When did you start taking these medications: 9/16/22    Which medication are you concerned about:citalopram (CeleXA) 20 MG tablet    Who prescribed you this medication: DR ALBRECHT    What are your concerns: PATIENT STATES IT MADE HIM VERY ANXIOUS AND PATIENT STATES HE COULDN'T FALL ASLEEP AND WAS UP ALL NIGHT.HE TOOK SOME BUSPAR TO CALL HIM DOWN(GIVEN BY MOM) AND HELPED HIM FALL ASLEEP.PATIENT REQUESTING CALLBACK TO GO OVER CONCERNS.    How long have you had these concerns: 9/2322

## 2022-09-23 NOTE — TELEPHONE ENCOUNTER
Talked with patient and mother on phone.  Provided reassurance.  Patient agreeable to continue medication at this time.  Prescribed hydroxyzine to help with anxiety/insomnia symptoms until his symptoms resolve.  Patient and mother agreeable with plan.

## 2022-10-05 ENCOUNTER — IMMUNIZATION (OUTPATIENT)
Dept: VACCINE CLINIC | Facility: HOSPITAL | Age: 22
End: 2022-10-05

## 2022-10-05 DIAGNOSIS — Z23 NEED FOR VACCINATION: Primary | ICD-10-CM

## 2022-10-05 PROCEDURE — 0124A: CPT | Performed by: OBSTETRICS & GYNECOLOGY

## 2022-10-05 PROCEDURE — 91312 HC SARSCOV2 VAC 30MCG/0.3ML IM BIVALENT BOOSTER 12 YRS AND OLDER: CPT | Performed by: OBSTETRICS & GYNECOLOGY

## 2022-10-17 ENCOUNTER — TELEPHONE (OUTPATIENT)
Dept: FAMILY MEDICINE CLINIC | Facility: CLINIC | Age: 22
End: 2022-10-17

## 2022-10-17 DIAGNOSIS — F32.A DEPRESSION, UNSPECIFIED DEPRESSION TYPE: Primary | ICD-10-CM

## 2022-10-17 NOTE — TELEPHONE ENCOUNTER
Caller: Wild Herrera    Relationship to patient: Self    Best call back number: [phone]    Patient is needing: PATIENT WOULD LIKE TO TALK ABOUT CHANGING HIS CELEXA.  HE IS HAVING A HARD TIME SLEEPING AND THINKS IT COULD BE THE CELEXA.  PLEASE CALL TO DISCUSS

## 2022-10-18 RX ORDER — ESCITALOPRAM OXALATE 10 MG/1
10 TABLET ORAL DAILY
Qty: 30 TABLET | Refills: 1 | Status: SHIPPED | OUTPATIENT
Start: 2022-10-18 | End: 2022-11-15

## 2022-10-18 NOTE — TELEPHONE ENCOUNTER
Talk with patient on telephone.  Patient reports some improvement with Celexa at the 20 mg dose, however, he feels like it is doing very little.  He still has significant ups and downs during the day.  His larger concern is he still is having trouble sleeping at night and feels his sleep has definitely been worse since starting Celexa.  At this point it appears the Celexa is causing more issues than its helping.  Patient agreeable to stopping Celexa and starting trial of Lexapro.  Patient has been taking an over-the-counter Benadryl based medication to help with sleep since he has been having so much difficulty the past few days.  Patient may continue to take this for a few more days while he transitions to Lexapro, however, advised patient to try to wean himself off of that at least several days prior to her next clinic visit so we can better determine how well the Lexapro is working.  Patient also states that he is continuing to see an online counselor 1 time per week and has connected with a counselor for in person counseling in the past month.  He is also using a phone garcia to help with relaxation several times a day.  He feels like all these are helping.

## 2022-11-15 ENCOUNTER — OFFICE VISIT (OUTPATIENT)
Dept: FAMILY MEDICINE CLINIC | Facility: CLINIC | Age: 22
End: 2022-11-15

## 2022-11-15 VITALS
HEIGHT: 68 IN | HEART RATE: 72 BPM | BODY MASS INDEX: 28.7 KG/M2 | OXYGEN SATURATION: 97 % | DIASTOLIC BLOOD PRESSURE: 84 MMHG | TEMPERATURE: 98.9 F | WEIGHT: 189.4 LBS | SYSTOLIC BLOOD PRESSURE: 106 MMHG

## 2022-11-15 DIAGNOSIS — F32.A DEPRESSION, UNSPECIFIED DEPRESSION TYPE: Primary | ICD-10-CM

## 2022-11-15 DIAGNOSIS — G47.00 INSOMNIA, UNSPECIFIED TYPE: ICD-10-CM

## 2022-11-15 PROCEDURE — 99213 OFFICE O/P EST LOW 20 MIN: CPT | Performed by: STUDENT IN AN ORGANIZED HEALTH CARE EDUCATION/TRAINING PROGRAM

## 2022-11-15 RX ORDER — TRAZODONE HYDROCHLORIDE 50 MG/1
25 TABLET ORAL NIGHTLY
Qty: 45 TABLET | Refills: 0 | Status: SHIPPED | OUTPATIENT
Start: 2022-11-15 | End: 2023-01-17

## 2022-11-15 RX ORDER — ESCITALOPRAM OXALATE 10 MG/1
10 TABLET ORAL DAILY
Qty: 90 TABLET | Refills: 1 | Status: SHIPPED | OUTPATIENT
Start: 2022-11-15

## 2022-11-15 RX ORDER — MELATONIN 200 MCG
9 TABLET ORAL NIGHTLY
COMMUNITY

## 2022-11-15 NOTE — PROGRESS NOTES
"Chief Complaint  Depression and Anxiety    Subjective        Wild Herrera presents to [facility]  History of Present Illness  Wild is an established patient presenting for follow-up of depression/anxiety.  He reports doing much better with the Lexapro but still feels its affecting his sleep just like Celexa.  His counselor had recommended melatonin Gummies which she has been taking and thinks it may be helping for some nights but he still has several nights where he only gets 2 to 3 hours of sleep before awakening and staying awake.  He has no trouble falling asleep.  He is still seeing a counselor face-to-face in [place] as well as an online counselor.    He still having some residual pain following surgery for hernia but is otherwise doing well.  He is having some level of job dissatisfaction.    Objective   Vital Signs:  /84 (BP Location: Left arm, Patient Position: Sitting, Cuff Size: Adult)   Pulse 72   Temp 98.9 °F (37.2 °C) (Temporal)   Ht 172.7 cm (68\")   Wt 85.9 kg (189 lb 6.4 oz)   SpO2 97%   BMI 28.80 kg/m²   Estimated body mass index is 28.8 kg/m² as calculated from the following:    Height as of this encounter: 172.7 cm (68\").    Weight as of this encounter: 85.9 kg (189 lb 6.4 oz).          Physical Exam  Vitals reviewed.   Constitutional:       General: He is not in acute distress.     Appearance: Normal appearance.   HENT:      Head: Normocephalic and atraumatic.   Eyes:      Extraocular Movements: Extraocular movements intact.      Conjunctiva/sclera: Conjunctivae normal.   Cardiovascular:      Rate and Rhythm: Normal rate and regular rhythm.      Heart sounds: Normal heart sounds. No murmur heard.    No friction rub. No gallop.   Pulmonary:      Effort: Pulmonary effort is normal.      Breath sounds: Normal breath sounds. No wheezing, rhonchi or rales.   Neurological:      General: No focal deficit present.      Mental Status: He is alert. Mental " status is at baseline.   Psychiatric:         Attention and Perception: Attention normal.         Mood and Affect: Mood and affect normal.         Speech: Speech normal.         Behavior: Behavior normal. Behavior is cooperative.         Thought Content: Thought content normal.         Cognition and Memory: Cognition normal.        Result Review :                Assessment and Plan   Diagnoses and all orders for this visit:    1. Depression, unspecified depression type (Primary)  -     escitalopram (Lexapro) 10 MG tablet; Take 1 tablet by mouth Daily.  Dispense: 90 tablet; Refill: 1    2. Insomnia, unspecified type  -     traZODone (DESYREL) 50 MG tablet; Take 0.5 tablets by mouth Every Night.  Dispense: 45 tablet; Refill: 0    Wild is an established patient presenting for follow-up of depression/anxiety.  Symptoms appear much improved with Lexapro and counseling, however, he continues to have issues with insomnia since starting SSRIs.  Unsure whether melatonin is working but feels he would benefit from a different sleep aid.  Beginning trial of trazodone.  Discussed adding fiber to his diet like psyllium husks which may help his residual abdominal pain following hernia surgery.       Follow Up   Return in about 2 months (around 1/15/2023) for Depression, insomnia.  Patient was given instructions and counseling regarding his condition or for health maintenance advice. Please see specific information pulled into the AVS if appropriate.

## 2023-01-17 ENCOUNTER — OFFICE VISIT (OUTPATIENT)
Dept: FAMILY MEDICINE CLINIC | Facility: CLINIC | Age: 23
End: 2023-01-17
Payer: COMMERCIAL

## 2023-01-17 VITALS
TEMPERATURE: 98 F | HEIGHT: 68 IN | DIASTOLIC BLOOD PRESSURE: 84 MMHG | WEIGHT: 207.6 LBS | HEART RATE: 88 BPM | BODY MASS INDEX: 31.46 KG/M2 | OXYGEN SATURATION: 97 % | SYSTOLIC BLOOD PRESSURE: 122 MMHG

## 2023-01-17 DIAGNOSIS — G47.00 INSOMNIA, UNSPECIFIED TYPE: ICD-10-CM

## 2023-01-17 DIAGNOSIS — G56.03 BILATERAL CARPAL TUNNEL SYNDROME: ICD-10-CM

## 2023-01-17 DIAGNOSIS — F32.A DEPRESSION, UNSPECIFIED DEPRESSION TYPE: Primary | ICD-10-CM

## 2023-01-17 PROCEDURE — 99214 OFFICE O/P EST MOD 30 MIN: CPT | Performed by: STUDENT IN AN ORGANIZED HEALTH CARE EDUCATION/TRAINING PROGRAM

## 2023-01-17 RX ORDER — IBUPROFEN 200 MG
400 TABLET ORAL EVERY 6 HOURS
Qty: 40 TABLET | Refills: 0 | Status: SHIPPED | OUTPATIENT
Start: 2023-01-17 | End: 2023-01-22

## 2023-01-17 RX ORDER — TRAZODONE HYDROCHLORIDE 50 MG/1
25 TABLET ORAL NIGHTLY
Qty: 45 TABLET | Refills: 1 | Status: SHIPPED | OUTPATIENT
Start: 2023-02-10

## 2023-01-17 NOTE — PATIENT INSTRUCTIONS
You seem to be doing well with your depression and insomnia symptoms.  We will continue the current medications.  If you could talk to your therapist about sending us some records that may help us manage her better in the long run.    Examination of your arms today shows symptoms consistent with carpal tunnel syndrome on both wrists.  Try getting wrist braces to wear at night and a few hours during the day.  He attention to how you are sitting and make sure your wrists are properly aligned when you are typing.  Maybe talk with employee health about better ergonomic options to help with your condition at work.  Try some ibuprofen 4 times a day for the next 5 days to help calm down any inflammation that is present.  We will give you a call in a few days with the x-ray results.    Do your best to try to manage calorie intake to achieve some weight loss.  The whole food plant-based diet is a great option, as well as the Mediterranean diet that is attached.  The only real way took cut back on weight though is to restrict calorie intake which is a challenge.  Try some of the suggestions we had today in clinic.    Please let us know if we can be of further assistance prior to next visit.

## 2023-01-17 NOTE — PROGRESS NOTES
Chief Complaint  Follow-up and Joint Pain (States concerns about having pain in wrist, elbows that has getting worse in the last two months.)    Subjective        Wild Herrera presents to [facility]  History of Present Illness  Wild is an established patient presenting for management of depression and insomnia.  He also has concerns for longstanding bilateral wrist pain which may be getting slightly worse.  He is doing well otherwise, and is continuing to consider job change.  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    Depression: Feels Paxil is working well for him.  He is continuing with counseling both in person and online as well.  He has developed more coping skills which, in combination with the medication, is working well for him.  He is also using the calm garcia and is walking a lot/exercising more.  No thoughts of harming self or others.  He has noticed being hungry a lot more since starting the Paxil and has noticed weight gain.    Insomnia: Feels his sleep is much improved since starting trazodone.  He does report occasional negative dreams which can be upsetting but not to the point that they are terrifying.  Would like to continue the trazodone.      Bilateral wrist pain: He reports pain bilaterally primarily in his wrists that extends down into his hands and up his muscles in his arm to his elbows.  This has been going on for years but may be a little worse in the past few months.  He denies numbness and tingling.  He does report muscle spasms in his arms multiple times a day that last for several minutes.  His symptoms are worse when he is using the keyboard at work.  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  He does not have a wrist rest and tries to set in an ergonomic position.  He denies associated neck or back pain.  He denies nighttime awakening with  "wrists and odd positions.    Objective   Vital Signs:  /84   Pulse 88   Temp 98 °F (36.7 °C)   Ht 172.7 cm (68\")   Wt 94.2 kg (207 lb 9.6 oz)   SpO2 97%   BMI 31.57 kg/m²   Estimated body mass index is 31.57 kg/m² as calculated from the following:    Height as of this encounter: 172.7 cm (68\").    Weight as of this encounter: 94.2 kg (207 lb 9.6 oz).             Physical Exam  Vitals reviewed.   Constitutional:       General: He is not in acute distress.     Appearance: Normal appearance.   HENT:      Head: Normocephalic and atraumatic.   Eyes:      Extraocular Movements: Extraocular movements intact.   Cardiovascular:      Rate and Rhythm: Normal rate and regular rhythm.      Heart sounds: Normal heart sounds. No murmur heard.    No friction rub. No gallop.   Pulmonary:      Effort: Pulmonary effort is normal.      Breath sounds: Normal breath sounds. No wheezing, rhonchi or rales.   Musculoskeletal:      Right elbow: Normal range of motion. Tenderness (R posterior distal humerus at olecranon fossa) present.      Left elbow: Normal range of motion. No tenderness.      Right wrist: No swelling, tenderness, bony tenderness, snuff box tenderness or crepitus. Normal range of motion.      Left wrist: Bony tenderness (distal, lateral radius) present. No swelling, tenderness, snuff box tenderness or crepitus. Normal range of motion.      Right hand: Normal. No swelling, tenderness or bony tenderness. Normal range of motion. Normal strength. Normal sensation.      Left hand: Normal. No swelling, tenderness or bony tenderness. Normal range of motion. Normal strength. Normal sensation.      Comments: Positive Phalen bilaterally, positive [place]inel on Left.  Tenderness end of radius on L, tender with tapping.  R posterior distal humerus tenderness at olecranon fossa.   Skin:     General: Skin is warm and dry.   Neurological:      General: No focal deficit present.      Mental Status: He is alert and oriented to " person, place, and time. Mental status is at baseline.   Psychiatric:         Attention and Perception: Attention and perception normal.         Mood and Affect: Mood and affect normal.         Speech: Speech normal.         Behavior: Behavior normal. Behavior is cooperative.         Thought Content: Thought content normal. Thought content does not include homicidal or suicidal ideation. Thought content does not include homicidal or suicidal plan.        Result Review :                   Assessment and Plan   Diagnoses and all orders for this visit:    1. Depression, unspecified depression type (Primary)    2. Insomnia, unspecified type  -     traZODone (DESYREL) 50 MG tablet; Take 0.5 tablets by mouth Every Night.  Dispense: 45 tablet; Refill: 1    3. Bilateral carpal tunnel syndrome  -     XR Wrist 3+ View Left; Future  -     XR Wrist 3+ View Right; Future    Other orders  -     ibuprofen (Advil) 200 MG tablet; Take 2 tablets by mouth Every 6 (Six) Hours for 5 days. Take with food  Dispense: 40 tablet; Refill: 0    Wild is an established patient presenting for management of depression and insomnia.  He also has concerns for longstanding bilateral wrist pain which may be getting slightly worse.      Depression: Symptoms well managed with Paxil, counseling, coping skills, calm garcia, increasing exercise.  Continuing current Paxil at this time.  Caution patient on further weight gain and encouraged him to work actively to reduce caloric intake.  Provided information on whole food plant-based diet and provided Mediterranean diet which was attached to the discharge.  Discussed focusing on eating low caloric density foods and increasing exercise to maintain muscle tone while cutting back on calories.  Asked patient to have counseling records forwarded to our clinic.    Insomnia: Patient doing well on trazodone.  Bad dreams are not an every day occurrence.  Continuing current trazodone for insomnia.      Bilateral wrist  pain: Positive Phalen bilaterally and positive [place] on left.  Also bony tenderness on left distal radius.  History, symptoms, and exam consistent with bilateral early stages of carpal tunnel syndrome.  Encouraged improvement in ergonomics at work.  Recommended nighttime wrist braces and occasional use during the day.  Trial of scheduled ibuprofen to help with inflammation.  Provided information for home carpal tunnel management.  Wrist x-rays for further evaluation, especially due to bony tenderness of radius.  No numbness and tingling.  Normal strength and range of motion in upper extremities bilaterally.  Further management pending x-ray results.       Follow Up   Return in about 3 months (around 4/17/2023) for Depression, insomnia.  Patient was given instructions and counseling regarding his condition or for health maintenance advice. Please see specific information pulled into the AVS if appropriate.

## 2023-02-14 DIAGNOSIS — G47.00 INSOMNIA, UNSPECIFIED TYPE: ICD-10-CM

## 2023-02-15 RX ORDER — TRAZODONE HYDROCHLORIDE 50 MG/1
TABLET ORAL
Qty: 45 TABLET | Refills: 1 | OUTPATIENT
Start: 2023-02-15

## 2023-04-18 ENCOUNTER — OFFICE VISIT (OUTPATIENT)
Dept: FAMILY MEDICINE CLINIC | Facility: CLINIC | Age: 23
End: 2023-04-18
Payer: COMMERCIAL

## 2023-04-18 VITALS
HEART RATE: 75 BPM | TEMPERATURE: 98.2 F | DIASTOLIC BLOOD PRESSURE: 80 MMHG | WEIGHT: 207.6 LBS | OXYGEN SATURATION: 97 % | HEIGHT: 68 IN | BODY MASS INDEX: 31.46 KG/M2 | SYSTOLIC BLOOD PRESSURE: 104 MMHG

## 2023-04-18 DIAGNOSIS — G47.00 INSOMNIA, UNSPECIFIED TYPE: ICD-10-CM

## 2023-04-18 DIAGNOSIS — F32.A DEPRESSION, UNSPECIFIED DEPRESSION TYPE: Primary | ICD-10-CM

## 2023-04-18 RX ORDER — BUSPIRONE HYDROCHLORIDE 7.5 MG/1
7.5 TABLET ORAL 2 TIMES DAILY
Qty: 60 TABLET | Refills: 2 | Status: SHIPPED | OUTPATIENT
Start: 2023-04-18

## 2023-04-18 NOTE — PROGRESS NOTES
"Chief Complaint  Follow-up, Depression, and Insomnia    Subjective        Wild Herrera presents to [facility]  History of Present Illness  Wild is an established patient presenting for follow-up for depression and insomnia.  He reports depression is overall doing well at this point on Lexapro, however, he does note some episodes of feeling down pretty much daily for a few minutes which then resolves.  Feels it may be associated with a little anxiety as well.  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  He is also back with his girlfriend which she says is going well.  He reports discontinuing the online counselor with his job change due to cost but plans to continue seeing his local counselor, plans a follow-up appointment in about 1 month.  He reports increasing his exercise, now multiple times per week.  He feels his sleep is better overall, no issues falling asleep, but does wake up with thoughts regarding the new job and his relationship in the middle of the night.  Typically has no issues going back to sleep.    Objective   Vital Signs:  /80 (BP Location: Left arm, Patient Position: Sitting, Cuff Size: Adult)   Pulse 75   Temp 98.2 °F (36.8 °C) (Temporal)   Ht 172.7 cm (68\")   Wt 94.2 kg (207 lb 9.6 oz)   SpO2 97%   BMI 31.57 kg/m²   Estimated body mass index is 31.57 kg/m² as calculated from the following:    Height as of this encounter: 172.7 cm (68\").    Weight as of this encounter: 94.2 kg (207 lb 9.6 oz).             Physical Exam  Vitals and nursing note reviewed.   Constitutional:       General: He is not in acute distress.     Appearance: Normal appearance.   HENT:      Head: Normocephalic and atraumatic.   Eyes:      Extraocular Movements: Extraocular movements intact.      Conjunctiva/sclera: Conjunctivae normal.   Cardiovascular:      Rate and Rhythm: Normal rate and regular rhythm.      Heart sounds: Normal heart " sounds. No murmur heard.    No friction rub. No gallop.   Pulmonary:      Effort: Pulmonary effort is normal.      Breath sounds: Normal breath sounds. No wheezing, rhonchi or rales.   Neurological:      General: No focal deficit present.      Mental Status: He is alert. Mental status is at baseline.   Psychiatric:         Attention and Perception: Attention and perception normal.         Mood and Affect: Mood and affect normal.         Speech: Speech normal.         Behavior: Behavior normal. Behavior is cooperative.         Thought Content: Thought content normal. Thought content does not include homicidal or suicidal ideation. Thought content does not include homicidal or suicidal plan.        Result Review :                   Assessment and Plan   Diagnoses and all orders for this visit:    1. Depression, unspecified depression type (Primary)  -     busPIRone (BUSPAR) 7.5 MG tablet; Take 1 tablet by mouth 2 (Two) Times a Day.  Dispense: 60 tablet; Refill: 2    2. Insomnia, unspecified type    Patient doing well on Lexapro.  Continuing.  Adding BuSpar to help with breakthrough symptoms of feeling down that are occurring daily.  Encouraged continuing counseling.  Encouraged being realistic about ups and downs that will happen in any job or any relationship.  Encouraged daily exercise that gets him out of the house early in the day.  Continue melatonin to help with sleep.       Follow Up   Return in about 6 weeks (around 5/31/2023), or depression, insomnia.  Patient was given instructions and counseling regarding his condition or for health maintenance advice. Please see specific information pulled into the AVS if appropriate.

## 2023-05-15 DIAGNOSIS — F32.A DEPRESSION, UNSPECIFIED DEPRESSION TYPE: ICD-10-CM

## 2023-05-15 RX ORDER — ESCITALOPRAM OXALATE 10 MG/1
10 TABLET ORAL DAILY
Qty: 90 TABLET | Refills: 3 | Status: SHIPPED | OUTPATIENT
Start: 2023-05-15

## 2023-05-15 NOTE — TELEPHONE ENCOUNTER
Dr Steve Herrera requested a refill of escitalopram (Lexapro) 10 MG tablet.   LOV 04/18/2023    Please advice

## 2023-05-31 ENCOUNTER — OFFICE VISIT (OUTPATIENT)
Dept: FAMILY MEDICINE CLINIC | Facility: CLINIC | Age: 23
End: 2023-05-31

## 2023-05-31 VITALS
OXYGEN SATURATION: 97 % | HEART RATE: 74 BPM | BODY MASS INDEX: 31.83 KG/M2 | DIASTOLIC BLOOD PRESSURE: 74 MMHG | WEIGHT: 210 LBS | TEMPERATURE: 97.7 F | SYSTOLIC BLOOD PRESSURE: 110 MMHG | HEIGHT: 68 IN

## 2023-05-31 DIAGNOSIS — G47.00 INSOMNIA, UNSPECIFIED TYPE: ICD-10-CM

## 2023-05-31 DIAGNOSIS — R10.814 LEFT LOWER QUADRANT ABDOMINAL TENDERNESS WITHOUT REBOUND TENDERNESS: ICD-10-CM

## 2023-05-31 DIAGNOSIS — F32.A DEPRESSION, UNSPECIFIED DEPRESSION TYPE: Primary | ICD-10-CM

## 2023-05-31 RX ORDER — BUSPIRONE HYDROCHLORIDE 7.5 MG/1
7.5 TABLET ORAL 2 TIMES DAILY
Qty: 180 TABLET | Refills: 3 | Status: SHIPPED | OUTPATIENT
Start: 2023-06-08

## 2023-05-31 NOTE — PROGRESS NOTES
"Chief Complaint  Follow-up, Depression, and Insomnia    Subjective        Wild Herrera presents to [facility]  History of Present Illness  Wild is an established patient presenting for follow-up for depression and insomnia.  He reports depression is overall doing well at this point on Lexapro and the addition of BuSpar is helping with the daily swings.  He is taking it twice daily.  He reports sleeping well overall and does not always feel like he needs the trazodone to help him sleep.  He does continue to take the melatonin.  He intends to follow-up with his counselor but he has not seen her for a couple of months now.  He feels things are going well and has not felt the need.  He has discontinued the online counselor.  His new job continues to go well and he likes it.  He is continuing his relationship with his girlfriend and they are going to the gym together 2-3 times a week.  He feels like his appetite has stabilized since starting to work out regularly.  He acknowledges the need to reduce sugar/carbs to try to lose some weight.    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  He indicates that he will have to have cataract in the other eye replaced eventually.    ROS: Denies headaches, shortness of breath, palpitations, nausea/vomiting/constipation/diarrhea, abdominal pain, blood in urine or stool, leg swelling.    Objective   Vital Signs:  /74 (BP Location: Left arm, Patient Position: Sitting, Cuff Size: Adult)   Pulse 74   Temp 97.7 °F (36.5 °C) (Temporal)   Ht 172.7 cm (68\")   Wt 95.3 kg (210 lb)   SpO2 97%   BMI 31.93 kg/m²   Estimated body mass index is 31.93 kg/m² as calculated from the following:    Height as of this encounter: 172.7 cm (68\").    Weight as of this encounter: 95.3 kg (210 lb).             Physical Exam  Vitals and nursing note reviewed. "   Constitutional:       General: He is not in acute distress.     Appearance: Normal appearance.   HENT:      Head: Normocephalic and atraumatic.   Eyes:      Extraocular Movements: Extraocular movements intact.      Conjunctiva/sclera: Conjunctivae normal.   Cardiovascular:      Rate and Rhythm: Normal rate and regular rhythm.      Heart sounds: Normal heart sounds. No murmur heard.    No friction rub. No gallop.   Pulmonary:      Effort: Pulmonary effort is normal.      Breath sounds: Normal breath sounds. No wheezing, rhonchi or rales.   Abdominal:      General: Bowel sounds are normal. There is no distension.      Palpations: Abdomen is soft.      Tenderness: There is abdominal tenderness (LLQ point tenderness). There is no right CVA tenderness, left CVA tenderness or guarding.      Comments: Negative [place], no rebound tenderness   Neurological:      General: No focal deficit present.      Mental Status: He is alert. Mental status is at baseline.   Psychiatric:         Attention and Perception: Attention and perception normal.         Mood and Affect: Mood and affect normal.         Speech: Speech normal.         Behavior: Behavior normal. Behavior is cooperative.         Thought Content: Thought content does not include homicidal or suicidal ideation. Thought content does not include homicidal or suicidal plan.        Result Review :                   Assessment and Plan   Diagnoses and all orders for this visit:    1. Depression, unspecified depression type (Primary)  -     busPIRone (BUSPAR) 7.5 MG tablet; Take 1 tablet by mouth 2 (Two) Times a Day.  Dispense: 180 tablet; Refill: 3    2. Insomnia, unspecified type    3. Left lower quadrant abdominal tenderness without rebound tenderness    Patient doing well on Lexapro and BuSpar combination, will continue both of these.  No thoughts of hurting himself or others.  Encouraged reengaging with counselor at least from time to time.  Continuing trazodone as  needed for sleep, but overall sleep is improving.  Continuing melatonin nightly. Encouraged regular workouts at the gym.  Encouraged improvement in diet to obtain some weight loss.    Abdominal tenderness on exam likely related to mild muscle strain with workouts.  He was not aware of tenderness until palpation by exam.  He acknowledges working really hard on abs couple of days ago and having to stop due to the exercises being so difficult.  Also, patient had not had a bowel movement today, so symptoms could be related to mild constipation.  I feel this is unlikely since patient reports regular soft bowel movements daily in general.  Urged follow-up if condition worsens.       Follow Up   Return in about 16 weeks (around 9/20/2023) for Annual physical.  Patient was given instructions and counseling regarding his condition or for health maintenance advice. Please see specific information pulled into the AVS if appropriate.

## 2023-08-08 DIAGNOSIS — G47.00 INSOMNIA, UNSPECIFIED TYPE: ICD-10-CM

## 2023-08-08 RX ORDER — TRAZODONE HYDROCHLORIDE 50 MG/1
TABLET ORAL
Qty: 45 TABLET | Refills: 1 | Status: SHIPPED | OUTPATIENT
Start: 2023-08-08

## 2023-09-20 ENCOUNTER — OFFICE VISIT (OUTPATIENT)
Dept: FAMILY MEDICINE CLINIC | Facility: CLINIC | Age: 23
End: 2023-09-20
Payer: COMMERCIAL

## 2023-09-20 VITALS
SYSTOLIC BLOOD PRESSURE: 110 MMHG | OXYGEN SATURATION: 98 % | TEMPERATURE: 98 F | HEIGHT: 68 IN | BODY MASS INDEX: 32.83 KG/M2 | WEIGHT: 216.6 LBS | DIASTOLIC BLOOD PRESSURE: 80 MMHG | HEART RATE: 74 BPM

## 2023-09-20 DIAGNOSIS — K21.9 GASTROESOPHAGEAL REFLUX DISEASE, UNSPECIFIED WHETHER ESOPHAGITIS PRESENT: ICD-10-CM

## 2023-09-20 DIAGNOSIS — F32.A DEPRESSION, UNSPECIFIED DEPRESSION TYPE: ICD-10-CM

## 2023-09-20 DIAGNOSIS — Z11.59 ENCOUNTER FOR HEPATITIS C SCREENING TEST FOR LOW RISK PATIENT: ICD-10-CM

## 2023-09-20 DIAGNOSIS — Z00.00 ANNUAL PHYSICAL EXAM: Primary | ICD-10-CM

## 2023-09-20 DIAGNOSIS — G47.00 INSOMNIA, UNSPECIFIED TYPE: ICD-10-CM

## 2023-09-20 DIAGNOSIS — Z13.6 ENCOUNTER FOR LIPID SCREENING FOR CARDIOVASCULAR DISEASE: ICD-10-CM

## 2023-09-20 DIAGNOSIS — Z13.220 ENCOUNTER FOR LIPID SCREENING FOR CARDIOVASCULAR DISEASE: ICD-10-CM

## 2023-09-20 DIAGNOSIS — R19.5 HARD STOOL: ICD-10-CM

## 2023-09-20 RX ORDER — OMEPRAZOLE 20 MG/1
20 CAPSULE, DELAYED RELEASE ORAL DAILY
Qty: 90 CAPSULE | Refills: 1 | Status: SHIPPED | OUTPATIENT
Start: 2023-09-20

## 2023-09-20 NOTE — PROGRESS NOTES
Chief Complaint  Annual Exam    Subjective        Wild Herrera presents to [facility]  History of Present Illness  Wild is an established patient presenting fo annual physical exam and for acid reflux and follow-up for anxiety and insomnia.  He has anxiety and a history of congenital cataracts.    Acid reflux/hard stool: Acid reflux symptoms began to worsen over the past few months.  Symptoms are most severe in the mornings when he awakens with burning throat, cough.  He also reports symptoms at night after eating.  He started using Tums which helps.  He also reports hard stools, but has regular daily bowel movements.  Rarely has some bright red blood on toilet paper, maybe once per month.    Depression/insomnia: No current depression symptoms on Lexapro and BuSpar.  He continues to take these as prescribed daily.  He was concerned with morning drowsiness and tried discontinuing both melatonin and trazodone.  His morning drowsiness resolved and he has not had further difficulty with sleep since that time.  He reports his relationship with his girlfriend is stable.    ROS: Denies headaches, changes in vision, changes in hearing, sore throat, shortness of breath, palpitations, nausea/vomiting/constipation/diarrhea, abdominal pain, blood in urine or stool, leg swelling.     FH:     Medical: N/A    Siblings: Sister with depression/anxiety    Children: None    Reproductive: No concerns of STIs today, declines testing.      SH    Nicotine: None    Illicit drugs: None    EtOH: 1x/wk    Home: Is in the process of moving into his own place he just bought in [place] closer to work    Work:  Working in  home, likes his work, nonstressful.    Social: Deferred    Preventative:    Colonoscopy: N/A    Vaccinations: Reports tetanus in the past 5 years, typically gets influenza vaccine at pharmacy    Eye: Regular visits in the past due to congenital cataracts, last 2023    Dental:  "Deferred    Exercise: Walking 10-15 min daily after work, typically 7d/wk    Diet: Trying to eat healthier now that he is living on his own and has more control    Objective   Vital Signs:  /80   Pulse 74   Temp 98 °F (36.7 °C)   Ht 172.7 cm (68\")   Wt 98.2 kg (216 lb 9.6 oz)   SpO2 98%   BMI 32.93 kg/m²   Estimated body mass index is 32.93 kg/m² as calculated from the following:    Height as of this encounter: 172.7 cm (68\").    Weight as of this encounter: 98.2 kg (216 lb 9.6 oz).       BMI is >= 30 and <35. (Class 1 Obesity). The following options were offered after discussion;: exercise counseling/recommendations, nutrition counseling/recommendations, and offered referral to nutritionist but patient declined at this time      Physical Exam  Vitals and nursing note reviewed.   Constitutional:       General: He is not in acute distress.     Appearance: Normal appearance.   HENT:      Head: Normocephalic and atraumatic.   Eyes:      Extraocular Movements: Extraocular movements intact.      Conjunctiva/sclera: Conjunctivae normal.   Cardiovascular:      Rate and Rhythm: Normal rate and regular rhythm.      Heart sounds: Normal heart sounds. No murmur heard.    No friction rub. No gallop.   Pulmonary:      Effort: Pulmonary effort is normal.      Breath sounds: Normal breath sounds. No wheezing, rhonchi or rales.   Abdominal:      General: Bowel sounds are normal. There is no distension.      Palpations: Abdomen is soft.      Tenderness: There is abdominal tenderness (mild LLQ TTP). There is no right CVA tenderness, left CVA tenderness or guarding.   Musculoskeletal:      Right lower leg: No edema.      Left lower leg: No edema.   Neurological:      General: No focal deficit present.      Mental Status: He is alert. Mental status is at baseline.   Psychiatric:         Attention and Perception: Attention and perception normal.         Mood and Affect: Mood normal.         Speech: Speech normal.         " Behavior: Behavior normal. Behavior is cooperative.      Result Review :                   Assessment and Plan   Diagnoses and all orders for this visit:    1. Annual physical exam (Primary)    2. Gastroesophageal reflux disease, unspecified whether esophagitis present  -     omeprazole (priLOSEC) 20 MG capsule; Take 1 capsule by mouth Daily.  Dispense: 90 capsule; Refill: 1    3. Hard stool    4. Depression, unspecified depression type  -     CBC & Differential; Future  -     Comprehensive Metabolic Panel; Future  -     TSH Rfx On Abnormal To Free T4; Future    5. Insomnia, unspecified type  -     CBC & Differential; Future  -     Comprehensive Metabolic Panel; Future  -     TSH Rfx On Abnormal To Free T4; Future    6. BMI 32.0-32.9,adult    7. Encounter for lipid screening for cardiovascular disease  -     Lipid Panel; Future    8. Encounter for hepatitis C screening test for low risk patient  -     Hepatitis C Antibody; Future    Acid reflux/hard stool: Pulled up patient's weight history graphing reviewed with him.  Encouraged improvement in diet, weight loss.  Reviewed whole food plant-based diet handout in detail with patient.  Also encouraged beginning psyllium supplement to help regulate stool consistency and bowel movements.  Encouraged avoiding trigger foods for acid reflux and limiting eating and drinking within 2 hours of bedtime.  Beginning omeprazole to help manage symptoms.  Encouraged increasing daily exercise.  Discussed ways to limit portion size to help with reduction in calorie intake to achieve meaningful weight loss.  Patient declined nutritionist consult at this time.    Depression/insomnia: Patient doing well on Lexapro and BuSpar combination, will continue both of these.  Since patient has been continuing to sleep well since discontinuing trazodone and melatonin, these will be discontinued at this point.    Preventative:    Colonoscopy: N/A    Vaccinations: Reports tetanus in the past 5  years, encouraged keeping vaccines up-to-date    Eye: Regular visits in the past due to congenital cataracts, last 7/2023    Dental: Deferred, encouraged regular dental exams    Exercise: Walking 10-15 min daily after work, typically 7d/wk, encouraged increasing daily exercise    Diet: Encouraged following principles of whole food plant-based diet that was reviewed today and discussed ways to reduce portion sizes to achieve meaningful weight loss.    Screening: Appropriate lab work ordered today.    Social: Encouraged social engagement         Follow Up   Return in about 3 months (around 12/12/2023) for Acid reflux, Anxiety, Insomnia, weight loss.  Patient was given instructions and counseling regarding his condition or for health maintenance advice. Please see specific information pulled into the AVS if appropriate.

## 2024-01-03 ENCOUNTER — OFFICE VISIT (OUTPATIENT)
Dept: FAMILY MEDICINE CLINIC | Facility: CLINIC | Age: 24
End: 2024-01-03
Payer: COMMERCIAL

## 2024-01-03 VITALS
HEIGHT: 68 IN | HEART RATE: 76 BPM | OXYGEN SATURATION: 97 % | DIASTOLIC BLOOD PRESSURE: 86 MMHG | TEMPERATURE: 98.2 F | SYSTOLIC BLOOD PRESSURE: 112 MMHG | BODY MASS INDEX: 33.28 KG/M2 | WEIGHT: 219.6 LBS

## 2024-01-03 DIAGNOSIS — K21.9 GASTROESOPHAGEAL REFLUX DISEASE, UNSPECIFIED WHETHER ESOPHAGITIS PRESENT: ICD-10-CM

## 2024-01-03 DIAGNOSIS — M79.18 ABDOMINAL MUSCLE PAIN: ICD-10-CM

## 2024-01-03 DIAGNOSIS — E78.3 MIXED HYPERGLYCERIDEMIA: ICD-10-CM

## 2024-01-03 DIAGNOSIS — G47.00 INSOMNIA, UNSPECIFIED TYPE: ICD-10-CM

## 2024-01-03 DIAGNOSIS — R74.8 ELEVATED LIVER ENZYMES: ICD-10-CM

## 2024-01-03 DIAGNOSIS — F41.8 ANXIETY WITH DEPRESSION: Primary | ICD-10-CM

## 2024-01-03 DIAGNOSIS — R73.01 ELEVATED FASTING GLUCOSE: ICD-10-CM

## 2024-01-03 PROCEDURE — 99214 OFFICE O/P EST MOD 30 MIN: CPT | Performed by: STUDENT IN AN ORGANIZED HEALTH CARE EDUCATION/TRAINING PROGRAM

## 2024-01-03 RX ORDER — ESCITALOPRAM OXALATE 10 MG/1
10 TABLET ORAL DAILY
Qty: 90 TABLET | Refills: 3 | Status: SHIPPED | OUTPATIENT
Start: 2024-01-03

## 2024-01-03 RX ORDER — PSYLLIUM HUSK 100 %
3 POWDER (GRAM) MISCELLANEOUS DAILY
Start: 2024-01-03

## 2024-01-03 RX ORDER — OMEPRAZOLE 20 MG/1
20 CAPSULE, DELAYED RELEASE ORAL
Qty: 45 CAPSULE | Refills: 1 | Status: SHIPPED | OUTPATIENT
Start: 2024-01-03

## 2024-01-03 RX ORDER — FAMOTIDINE 20 MG/1
20 TABLET, FILM COATED ORAL 2 TIMES DAILY PRN
Qty: 180 TABLET | Refills: 3 | Status: SHIPPED | OUTPATIENT
Start: 2024-01-03

## 2024-01-03 NOTE — PROGRESS NOTES
"Chief Complaint  Follow-up (Medication)    Subjective        Wild Herrera presents to [facility]  History of Present Illness  Wild is an established patient presenting for follow-up for acid reflux, anxiety, insomnia, weight loss, hyperlipidemia, elevated liver enzymes.  He has mixed hyperlipidemia, anxiety and a history of congenital cataracts.    Anxiety/depression/insomnia: Reports symptoms doing well on current Lexapro and BuSpar.  Insomnia has resolved.    GERD/hyperlipidemia: Reports symptoms fully resolved on Prilosec.  He has improved diet and is now eating oatmeal and fruit in the mornings, more vegetables and salads, has cut back on red meats.  He is still eating fried chicken and French fries for lunch daily.  Exercises by walking the dog for 15 minutes 2 times per week.  Is considering starting rollerskating again with his girlfriend.  He is taking psyllium, half teaspoon daily.    Abdominal pain: He reports generalized abdominal pain with lifting and moving objects.  Specifically, when moving large objects at work he feels he develops pain too quickly.  Also, he had to take a break while putting away [employer] boxes.  He has had hernia surgery 8/24/2022, but he feels he fully recovered from that.    Objective   Vital Signs:  /86   Pulse 76   Temp 98.2 °F (36.8 °C)   Ht 172.7 cm (68\")   Wt 99.6 kg (219 lb 9.6 oz)   SpO2 97%   BMI 33.39 kg/m²   Estimated body mass index is 33.39 kg/m² as calculated from the following:    Height as of this encounter: 172.7 cm (68\").    Weight as of this encounter: 99.6 kg (219 lb 9.6 oz).               Physical Exam  Vitals and nursing note reviewed.   Constitutional:       General: He is not in acute distress.     Appearance: Normal appearance.   HENT:      Head: Normocephalic and atraumatic.   Eyes:      Extraocular Movements: Extraocular movements intact.      Conjunctiva/sclera: Conjunctivae normal. "   Cardiovascular:      Rate and Rhythm: Normal rate and regular rhythm.      Heart sounds: Normal heart sounds. No murmur heard.     No friction rub. No gallop.   Pulmonary:      Effort: Pulmonary effort is normal.      Breath sounds: Normal breath sounds. No wheezing, rhonchi or rales.   Abdominal:      General: Bowel sounds are normal. There is no distension.      Palpations: Abdomen is soft.      Tenderness: There is abdominal tenderness (LUQ and RLQ). There is no guarding.   Neurological:      General: No focal deficit present.      Mental Status: He is alert. Mental status is at baseline.   Psychiatric:         Mood and Affect: Mood normal.         Behavior: Behavior normal.        Result Review :                   Assessment and Plan   Diagnoses and all orders for this visit:    1. Anxiety with depression (Primary)  -     escitalopram (LEXAPRO) 10 MG tablet; Take 1 tablet by mouth Daily.  Dispense: 90 tablet; Refill: 3    2. Insomnia, unspecified type    3. Gastroesophageal reflux disease, unspecified whether esophagitis present  -     famotidine (Pepcid) 20 MG tablet; Take 1 tablet by mouth 2 (Two) Times a Day As Needed for Heartburn. Take 1 hour before or 2 hours after meals  Dispense: 180 tablet; Refill: 3  -     omeprazole (priLOSEC) 20 MG capsule; Take 1 capsule by mouth Every Other Day.  Dispense: 45 capsule; Refill: 1    4. Mixed hyperglyceridemia  -     Comprehensive metabolic panel; Future  -     Psyllium Husk powder; Use 3 g Daily.    5. Abdominal muscle pain    6. Elevated liver enzymes  -     Comprehensive metabolic panel; Future    7. Elevated fasting glucose  -     Hemoglobin A1c; Future    Anxiety/depression/insomnia: Will continue current Lexapro and BuSpar since patient is doing well with these. Insomnia has resolved.    GERD/hyperlipidemia: Patient will try to wean off of Pepcid and use Prilosec for any breakthrough symptoms.  Discussed weaning schedule.  Discussed importance of weight loss  to help prevent recurrence of symptoms.  Also discussed monitoring foods that may cause heartburn and trying to avoid them.  Encouraged further dietary improvements and increased exercise to help with weight loss and cholesterol.  Encouraged continuing psyllium for cholesterol management.    Abdominal pain: Generalized abdominal pain appears to be more muscular than GI related.  I think it is possible that patient had some deconditioning after his hernia surgery 8/24/2022 and may need some additional physical strengthening to regain full abdominal muscle functionality.  Encourage patient to consider PT to help with abdominal muscle strengthening.  He will consider this and call if he would like a referral.    Elevated liver enzymes/elevated fasting glucose: Noted on 11/1/2023 labs.  Will recheck CMP and A1c.  Further management pending results.    Preventative:    Colonoscopy: N/A    Vaccinations: Reports tetanus in the past 5 years, encouraged keeping vaccines up-to-date    Eye: Regular visits in the past due to congenital cataracts, last 7/2023    Dental: Deferred, encouraged regular dental exams    Home: alone, sees girlfriend regularly    Work: [employer] staff    Exercise: Walking 15 min twice a week, and is considering starting [employer] regularly.  Encouraged increasing daily exercise.    Diet: Encouraged following principles of whole food plant-based diet that was reviewed at previous visit.  Dietary improvement noted which includes oatmeal and fruit in the mornings, more vegetables and salads, has cut back on red meats.  He is still eating fried chicken and French fries for lunch daily.  Half teaspoon of psyllium daily as well.  Encouraged cutting back on calorie intake for meaningful weight loss.  Social: Encouraged social engagement       Follow Up   Return in about 6 months (around 7/3/2024) for Anxiety, depression, GERD.  Patient was given instructions and counseling regarding his  condition or for health maintenance advice. Please see specific information pulled into the AVS if appropriate.

## 2024-06-29 DIAGNOSIS — F32.A DEPRESSION, UNSPECIFIED DEPRESSION TYPE: ICD-10-CM

## 2024-07-01 RX ORDER — BUSPIRONE HYDROCHLORIDE 7.5 MG/1
7.5 TABLET ORAL 2 TIMES DAILY
Qty: 180 TABLET | Refills: 3 | Status: SHIPPED | OUTPATIENT
Start: 2024-07-01

## 2024-07-01 NOTE — TELEPHONE ENCOUNTER
Rx Refill Note  Requested Prescriptions     Pending Prescriptions Disp Refills    busPIRone (BUSPAR) 7.5 MG tablet [Pharmacy Med Name: BUSPIRONE 7.5MG TABLETS] 180 tablet 3     Sig: TAKE 1 TABLET BY MOUTH TWICE DAILY      Last office visit with prescribing clinician: 1/3/2024   Last telemedicine visit with prescribing clinician: Visit date not found   Next office visit with prescribing clinician: 7/9/2024   Please advise this refill

## 2024-07-09 ENCOUNTER — OFFICE VISIT (OUTPATIENT)
Dept: FAMILY MEDICINE CLINIC | Facility: CLINIC | Age: 24
End: 2024-07-09
Payer: COMMERCIAL

## 2024-07-09 VITALS
WEIGHT: 220.6 LBS | HEART RATE: 71 BPM | HEIGHT: 68 IN | OXYGEN SATURATION: 96 % | TEMPERATURE: 97.1 F | BODY MASS INDEX: 33.43 KG/M2 | SYSTOLIC BLOOD PRESSURE: 118 MMHG | DIASTOLIC BLOOD PRESSURE: 82 MMHG

## 2024-07-09 DIAGNOSIS — E78.2 MIXED HYPERLIPIDEMIA: ICD-10-CM

## 2024-07-09 DIAGNOSIS — F32.A DEPRESSION, UNSPECIFIED DEPRESSION TYPE: Primary | ICD-10-CM

## 2024-07-09 DIAGNOSIS — K21.9 GASTROESOPHAGEAL REFLUX DISEASE WITHOUT ESOPHAGITIS: ICD-10-CM

## 2024-07-09 PROCEDURE — 99214 OFFICE O/P EST MOD 30 MIN: CPT | Performed by: STUDENT IN AN ORGANIZED HEALTH CARE EDUCATION/TRAINING PROGRAM

## 2024-07-09 RX ORDER — BUSPIRONE HYDROCHLORIDE 7.5 MG/1
7.5 TABLET ORAL 2 TIMES DAILY
Qty: 180 TABLET | Refills: 3 | Status: SHIPPED | OUTPATIENT
Start: 2024-07-09

## 2024-07-09 NOTE — PROGRESS NOTES
"Chief Complaint  Depression, GERD    Subjective        Wild Herrera presents to [facility]  History of Present Illness  Wild is an established patient presenting for follow-up for acid reflux, depression.  He has mixed hyperlipidemia, anxiety/depression, GERD, and a history of congenital cataracts.  2/1/2024 CMP normal except for elevated ALT, A1c in prediabetic range.  11/1/2023 CBC normal, total cholesterol elevated, HDL low, LDL elevated, triglycerides elevated, TSH normal.    Depression: His depression is well-managed with current Lexapro, BuSpar.  He has been out of the BuSpar for 2 days and has not observed any noticeable changes.  Work/relationships are stable.    GERD/hyperlipidemia: He is weaned off of omeprazole and is now taking famotidine twice daily.  He feels this is working well for him.  He does have occasional symptoms of GERD when he forgets to take famotidine.  He is continuing to take the psyllium with oatmeal each morning.  He has made dietary modifications, including reducing his intake of sodas and cheese, to twice weekly. He expresses a desire for weight loss, but his weight has remained stable. His physical activity is limited, although he intends to incorporate walking into his routine.    Objective   Vital Signs:  /82   Pulse 71   Temp 97.1 °F (36.2 °C)   Ht 172.7 cm (68\")   Wt 100 kg (220 lb 9.6 oz)   SpO2 96%   BMI 33.54 kg/m²   Estimated body mass index is 33.54 kg/m² as calculated from the following:    Height as of this encounter: 172.7 cm (68\").    Weight as of this encounter: 100 kg (220 lb 9.6 oz).               Physical Exam  Vitals and nursing note reviewed.   Constitutional:       General: He is not in acute distress.     Appearance: Normal appearance.   HENT:      Head: Normocephalic and atraumatic.   Eyes:      Extraocular Movements: Extraocular movements intact.      Conjunctiva/sclera: Conjunctivae normal.   Cardiovascular:      " Rate and Rhythm: Normal rate and regular rhythm.      Heart sounds: Normal heart sounds. No murmur heard.     No friction rub. No gallop.   Pulmonary:      Effort: Pulmonary effort is normal.      Breath sounds: Normal breath sounds. No wheezing, rhonchi or rales.   Abdominal:      General: Bowel sounds are normal. There is no distension.      Palpations: Abdomen is soft.      Tenderness: There is no abdominal tenderness. There is no guarding.   Musculoskeletal:      Right lower leg: No edema.      Left lower leg: No edema.   Neurological:      General: No focal deficit present.      Mental Status: He is alert. Mental status is at baseline.   Psychiatric:         Mood and Affect: Mood normal.         Behavior: Behavior normal.        Physical Exam      Result Review :          Results               Assessment and Plan     Diagnoses and all orders for this visit:    1. Depression, unspecified depression type (Primary)  -     busPIRone (BUSPAR) 7.5 MG tablet; Take 1 tablet by mouth 2 (Two) Times a Day.  Dispense: 180 tablet; Refill: 3    2. Gastroesophageal reflux disease without esophagitis    3. Mixed hyperlipidemia      Assessment & Plan  1.  Depression  Doing well on current Lexapro and buspirone.  Continuing.    2.  GERD.  Now doing well on famotidine.  Encouraged further improving diet.    3.  Hyperlipidemia.  Continue current psyllium.  Encouraged further dietary improvements, exercise, weight loss.    Follow-up  A follow-up visit is scheduled for 6 months from now.         Follow Up     Return in about 6 months (around 1/9/2025) for Annual physical, Anxiety, GERD.  Patient was given instructions and counseling regarding his condition or for health maintenance advice. Please see specific information pulled into the AVS if appropriate.     Patient or patient representative verbalized consent for the use of Ambient Listening during the visit with  Denzel Wilson MD for chart documentation. 7/9/2024  20:04  EDT

## 2024-11-04 ENCOUNTER — PATIENT ROUNDING (BHMG ONLY) (OUTPATIENT)
Dept: FAMILY MEDICINE CLINIC | Facility: CLINIC | Age: 24
End: 2024-11-04
Payer: COMMERCIAL

## 2024-11-04 ENCOUNTER — OFFICE VISIT (OUTPATIENT)
Dept: FAMILY MEDICINE CLINIC | Facility: CLINIC | Age: 24
End: 2024-11-04
Payer: COMMERCIAL

## 2024-11-04 VITALS
BODY MASS INDEX: 32.6 KG/M2 | SYSTOLIC BLOOD PRESSURE: 102 MMHG | WEIGHT: 214.4 LBS | DIASTOLIC BLOOD PRESSURE: 70 MMHG | TEMPERATURE: 98.4 F | HEART RATE: 72 BPM | OXYGEN SATURATION: 96 % | RESPIRATION RATE: 16 BRPM

## 2024-11-04 DIAGNOSIS — Z00.00 ANNUAL PHYSICAL EXAM: Primary | ICD-10-CM

## 2024-11-04 DIAGNOSIS — Z23 IMMUNIZATION DUE: ICD-10-CM

## 2024-11-04 DIAGNOSIS — R73.03 PREDIABETES: ICD-10-CM

## 2024-11-04 DIAGNOSIS — F32.5 MAJOR DEPRESSIVE DISORDER IN FULL REMISSION, UNSPECIFIED WHETHER RECURRENT: ICD-10-CM

## 2024-11-04 PROBLEM — E66.811 OBESITY (BMI 30.0-34.9): Status: ACTIVE | Noted: 2024-11-04

## 2024-11-04 PROCEDURE — 90656 IIV3 VACC NO PRSV 0.5 ML IM: CPT | Performed by: FAMILY MEDICINE

## 2024-11-04 PROCEDURE — 90472 IMMUNIZATION ADMIN EACH ADD: CPT | Performed by: FAMILY MEDICINE

## 2024-11-04 PROCEDURE — 90715 TDAP VACCINE 7 YRS/> IM: CPT | Performed by: FAMILY MEDICINE

## 2024-11-04 PROCEDURE — 99395 PREV VISIT EST AGE 18-39: CPT | Performed by: FAMILY MEDICINE

## 2024-11-04 PROCEDURE — 90471 IMMUNIZATION ADMIN: CPT | Performed by: FAMILY MEDICINE

## 2024-11-04 NOTE — PROGRESS NOTES
Comments: RUBEN Subjective   Wild Herrera is a 24 y.o. male who presents for annual male wellness exam.  Chief Complaint   Patient presents with    Establish Care     Wants to do his annual physical.         Sexual History: one female partner   Diet: fast food and prepackaged food  Exercise: yes  Do you feel safe? yes  Are you being abused? no  Last dental exam: few years*  Eye exam: this year    Colon Cancer Screening: none  PSA: none  No fhx of colon or prostate cancer    Patient denies acute complaints    Patient has history of anxiety and depression, currently on Lexapro 10 mg.  Patient states symptoms are well-controlled and overall feels 80% better.  Patient denies depression, anxiety and suicidal thoughts.    Immunization History   Administered Date(s) Administered    COVID-19 (MODERNA) 1st,2nd,3rd Dose Monovalent 01/08/2021, 02/08/2021    COVID-19 (MODERNA) Monovalent Original Booster 12/09/2021    COVID-19 (PFIZER) BIVALENT 12+YRS 10/05/2022    DTaP 1998, 1998, 1998, 06/11/1999, 07/26/2002    FluMist 2-49yrs 10/15/2009, 11/18/2011, 10/25/2012    Fluad Quad 65+ 01/20/2020    Flublok 18+yrs 01/20/2020    Fluzone (or Fluarix & Flulaval for VFC) >6mos 10/30/2013, 10/22/2014    HPV Quadrivalent 08/02/2012, 08/02/2013, 08/04/2014    Hep A, 2 Dose 07/23/2009, 08/06/2010    Hep B, Adolescent or Pediatric 1998, 1998, 1998    Hib (PRP-OMP) 1998, 1998, 1998, 06/11/1999    IPV 1998, 1998, 06/11/1999, 07/26/2002    MMR 06/11/1999, 07/26/2002    Meningococcal C Conjugate 08/04/2014    Meningococcal MCV4P (Menactra) 07/23/2009, 08/04/2014    Tdap 07/23/2009    Varicella 07/26/2002, 07/23/2009       The following portions of the patient's history were reviewed and updated as appropriate: allergies, current medications, past family history, past medical history, past social history, past surgical history and problem list.    Past Medical History:   Diagnosis Date     Anxiety     Asthma NA    Mild Exercise induced asthma. Had issues with it playing sports in high school but havent had any issues since.    Cataract 2018    Had cataract surgery in the right eye.    Depression     GERD (gastroesophageal reflux disease) 2023    Acid Reflux    History of left cataract surgery     Inguinal hernia     Tonsillitis     Vaso vagal episode 08/08/2022    r/t inguinal hernia pain       Past Surgical History:   Procedure Laterality Date    CATARACT EXTRACTION      CATARACT EXTRACTION Left 07/25/2023    INGUINAL HERNIA REPAIR Left 08/24/2022    Procedure: BILATERAL INGUINAL HERNIA REPAIR LAPAROSCOPIC;  Surgeon: Veronica Danielle MD;  Location: [facility] OR;  Service: General;  Laterality: Left;    INGUINAL HERNIA REPAIR  Aug 24 2022    KNEE SURGERY Left     TONSILLECTOMY AND ADENOIDECTOMY         Family History   Problem Relation Age of Onset    Hypertension Mother     Kidney disease Mother     No Known Problems Father     Depression Sister     Anxiety disorder Sister     Malig Hyperthermia Neg Hx        Social History     Socioeconomic History    Marital status: Single   Tobacco Use    Smoking status: Never    Smokeless tobacco: Never   Vaping Use    Vaping status: Never Used   Substance and Sexual Activity    Alcohol use: Yes     Alcohol/week: 1.0 standard drink of alcohol     Types: 1 Cans of beer per week     Comment: social     Drug use: Never    Sexual activity: Yes     Partners: Female     Birth control/protection: Condom, Patch         Current Outpatient Medications:     escitalopram (LEXAPRO) 10 MG tablet, Take 1 tablet by mouth Daily., Disp: 90 tablet, Rfl: 3    famotidine (Pepcid) 20 MG tablet, Take 1 tablet by mouth 2 (Two) Times a Day As Needed for Heartburn. Take 1 hour before or 2 hours after meals, Disp: 180 tablet, Rfl: 3    Review of Systems   Constitutional:  Negative for appetite change, diaphoresis, fatigue, fever and unexpected weight change.   Respiratory:  Negative for  Render Post-Care In The Note: no Protocol For Nb Uva: The patient received NB UVA. cough, chest tightness, shortness of breath and wheezing.    Cardiovascular:  Negative for chest pain, palpitations and leg swelling.   Gastrointestinal:  Negative for abdominal pain, blood in stool, constipation, diarrhea, nausea and vomiting.   Endocrine: Negative for polydipsia and polyuria.   Genitourinary:  Negative for dysuria, scrotal swelling and testicular pain.   Neurological:  Negative for dizziness, seizures, syncope, weakness, light-headedness, numbness and headaches.   Psychiatric/Behavioral:  Negative for dysphoric mood and suicidal ideas. The patient is not nervous/anxious.        Objective   Vitals:    11/04/24 0929   BP: 102/70   Pulse: 72   Resp: 16   Temp: 98.4 °F (36.9 °C)   SpO2: 96%     Body mass index is 32.6 kg/m².  Physical Exam  Constitutional:       General: He is not in acute distress.     Appearance: Normal appearance. He is not ill-appearing.   HENT:      Head: Normocephalic and atraumatic.      Right Ear: Tympanic membrane, ear canal and external ear normal.      Left Ear: Tympanic membrane, ear canal and external ear normal.      Nose: Nose normal. No congestion.      Mouth/Throat:      Mouth: Mucous membranes are moist.   Cardiovascular:      Rate and Rhythm: Normal rate and regular rhythm.      Pulses: Normal pulses.      Heart sounds: No murmur heard.  Pulmonary:      Effort: Pulmonary effort is normal.      Breath sounds: Normal breath sounds.   Abdominal:      General: Abdomen is flat.      Palpations: Abdomen is soft.      Tenderness: There is no abdominal tenderness.   Musculoskeletal:      Right lower leg: No edema.      Left lower leg: No edema.   Lymphadenopathy:      Cervical: No cervical adenopathy.   Skin:     Capillary Refill: Capillary refill takes less than 2 seconds.   Neurological:      Mental Status: He is alert.   Psychiatric:         Mood and Affect: Mood normal.         Behavior: Behavior normal.           Assessment & Plan   Diagnoses and all orders for this  Protocol For Photochemotherapy: Tar And Broad Band Uvb (Goeckerman Treatment): The patient received Photochemotherapy: Tar and Broad Band UVB (Goeckerman treatment). visit:    1. Annual physical exam (Primary)  -     Lipid Panel  -     CBC & Differential  -     Comprehensive Metabolic Panel  -     TSH Rfx On Abnormal To Free T4    2. Prediabetes  -     Hemoglobin A1c    3. Immunization due  -     Fluzone >6mos ([id number])  -     Tdap Vaccine => 8yo IM (BOOSTRIX/ADACEL)    4. Major depressive disorder in full remission, unspecified whether recurrent        Annual physical  Check routine labs  Flu and Tdap vaccines  Discussed the importance of maintaining a healthy weight and getting regular exercise.  Educated patient on the benefits of healthy diet.  Advise follow-up annually for wellness exams.    Prediabetes/hyperlipidemia  Discussed diet and exercise  Recheck A1c and lipid panel    Anxiety/depression  Well-controlled  Continue Lexapro 10 mg    Patient Instructions   Avoid food rich in carbohydrates like bread, potatoes and rice, also avoid fatty and fried food        Protocol For Puva: The patient received PUVA. Changes In Treatment Protocol: Increase by 15% should no irritation occur Protocol For Photochemotherapy: Baby Oil And Nbuvb: The patient received Photochemotherapy: Baby Oil and NBUVB (baby oil applied to all lesions prior to phototherapy). Consent: Written consent obtained.  The risks were reviewed with the patient including but not limited to: burn, pigmentary changes, pain, blistering, scabbing, redness, increased risk of skin cancers, and the remote possibility of scarring. Detail Level: Zone Protocol For Photochemotherapy: Mineral Oil And Broad Band Uvb: The patient received Photochemotherapy: Mineral Oil and Broad Band UVB. Protocol For Nbuvb: The patient received NBUVB. Protocol For Photochemotherapy: Mineral Oil And Nbuvb: The patient received Photochemotherapy: Mineral Oil and NBUVB (mineral oil applied to all lesions prior to phototherapy). Treatment Number: 13 Protocol For Photochemotherapy For Severe Photoresponsive Dermatoses: Puva: The patient received Photochemotherapy for severe photoresponsive dermatoses: PUVA requiring at least 4 to 8 hours of care under direct physician supervision. Protocol For Uva: The patient received UVA. Name Of Supervising Technician: Jc Protocol For Photochemotherapy: Petrolatum And Broad Band Uvb: The patient received Photochemotherapy: Petrolatum and Broad Band UVB. Protocol For Photochemotherapy For Severe Photoresponsive Dermatoses: Tar And Broad Band Uvb (Goeckerman Treatment): The patient received Photochemotherapy for severe photoresponsive dermatoses: Tar and Broad Band UVB (Goeckerman treatment) requiring at least 4 to 8 hours of care under direct physician supervision. Protocol For Photochemotherapy For Severe Photoresponsive Dermatoses: Tar And Nbuvb (Goeckerman Treatment): The patient received Photochemotherapy for severe photoresponsive dermatoses: Tar and NBUVB (Goeckerman treatment) requiring at least 4 to 8 hours of care under direct physician supervision. Post-Care Instructions: I reviewed with the patient in detail post-care instructions. Patient is to wear sun protection. Patients may expect sunburn like redness, discomfort and scabbing. Protocol For Uva1: The patient received UVA1. Protocol For Photochemotherapy: Tar And Nbuvb (Goeckerman Treatment): The patient received Photochemotherapy: Tar and NBUVB (Goeckerman treatment). Total Body Energy: 1000 Protocol For Bath Puva: The patient received Bath PUVA. Skin Type: II Protocol For Photochemotherapy: Triamcinolone Ointment And Nbuvb: The patient received Photochemotherapy: Triamcinolone and NBUVB (triamcinolone ointment applied to all lesions prior to phototherapy). Protocol For Photochemotherapy For Severe Photoresponsive Dermatoses: Petrolatum And Nbuvb: The patient received Photochemotherapy for severe photoresponsive dermatoses: Petrolatum and NBUVB requiring at least 4 to 8 hours of care under direct physician supervision. Protocol: NBUVB Protocol For Photochemotherapy For Severe Photoresponsive Dermatoses: Petrolatum And Broad Band Uvb: The patient received Photochemotherapyfor severe photoresponsive dermatoses: Petrolatum and Broad Band UVB requiring at least 4 to 8 hours of care under direct physician supervision. Protocol For Protocol For Photochemotherapy For Severe Photoresponsive Dermatoses: Bath Puva: The patient received Photochemotherapy for severe photoresponsive dermatoses: Bath PUVA requiring at least 4 to 8 hours of care under direct physician supervision. Protocol For Photochemotherapy: Petrolatum And Nbuvb: The patient received Photochemotherapy: Petrolatum and NBUVB (petrolatum applied to all lesions prior to phototherapy). Total Body Time: 4:40 Protocol For Broad Band Uvb: The patient received Broad Band UVB.

## 2024-11-19 ENCOUNTER — TELEPHONE (OUTPATIENT)
Dept: FAMILY MEDICINE CLINIC | Facility: CLINIC | Age: 24
End: 2024-11-19
Payer: COMMERCIAL

## 2024-12-17 LAB
ALBUMIN SERPL-MCNC: 4.1 G/DL (ref 3.5–5.2)
ALBUMIN/GLOB SERPL: 1.5 G/DL
ALP SERPL-CCNC: 63 U/L (ref 39–117)
ALT SERPL-CCNC: 85 U/L (ref 1–41)
AST SERPL-CCNC: 40 U/L (ref 1–40)
BASOPHILS # BLD AUTO: 0.05 10*3/MM3 (ref 0–0.2)
BASOPHILS NFR BLD AUTO: 0.9 % (ref 0–1.5)
BILIRUB SERPL-MCNC: 1.3 MG/DL (ref 0–1.2)
BUN SERPL-MCNC: 17 MG/DL (ref 6–20)
BUN/CREAT SERPL: 17.7 (ref 7–25)
CALCIUM SERPL-MCNC: 9.6 MG/DL (ref 8.6–10.5)
CHLORIDE SERPL-SCNC: 103 MMOL/L (ref 98–107)
CHOLEST SERPL-MCNC: 219 MG/DL (ref 0–200)
CO2 SERPL-SCNC: 26.9 MMOL/L (ref 22–29)
CREAT SERPL-MCNC: 0.96 MG/DL (ref 0.76–1.27)
EGFRCR SERPLBLD CKD-EPI 2021: 111.8 ML/MIN/1.73
EOSINOPHIL # BLD AUTO: 0.09 10*3/MM3 (ref 0–0.4)
EOSINOPHIL NFR BLD AUTO: 1.6 % (ref 0.3–6.2)
ERYTHROCYTE [DISTWIDTH] IN BLOOD BY AUTOMATED COUNT: 12.6 % (ref 12.3–15.4)
GLOBULIN SER CALC-MCNC: 2.7 GM/DL
GLUCOSE SERPL-MCNC: 96 MG/DL (ref 65–99)
HBA1C MFR BLD: 5.7 % (ref 4.8–5.6)
HCT VFR BLD AUTO: 46.6 % (ref 37.5–51)
HDLC SERPL-MCNC: 27 MG/DL (ref 40–60)
HGB BLD-MCNC: 16.1 G/DL (ref 13–17.7)
IMM GRANULOCYTES # BLD AUTO: 0.02 10*3/MM3 (ref 0–0.05)
IMM GRANULOCYTES NFR BLD AUTO: 0.3 % (ref 0–0.5)
LDLC SERPL CALC-MCNC: 156 MG/DL (ref 0–100)
LYMPHOCYTES # BLD AUTO: 2 10*3/MM3 (ref 0.7–3.1)
LYMPHOCYTES NFR BLD AUTO: 34.7 % (ref 19.6–45.3)
MCH RBC QN AUTO: 30.9 PG (ref 26.6–33)
MCHC RBC AUTO-ENTMCNC: 34.5 G/DL (ref 31.5–35.7)
MCV RBC AUTO: 89.4 FL (ref 79–97)
MONOCYTES # BLD AUTO: 0.4 10*3/MM3 (ref 0.1–0.9)
MONOCYTES NFR BLD AUTO: 6.9 % (ref 5–12)
NEUTROPHILS # BLD AUTO: 3.2 10*3/MM3 (ref 1.7–7)
NEUTROPHILS NFR BLD AUTO: 55.6 % (ref 42.7–76)
NRBC BLD AUTO-RTO: 0 /100 WBC (ref 0–0.2)
PLATELET # BLD AUTO: 301 10*3/MM3 (ref 140–450)
POTASSIUM SERPL-SCNC: 4.8 MMOL/L (ref 3.5–5.2)
PROT SERPL-MCNC: 6.8 G/DL (ref 6–8.5)
RBC # BLD AUTO: 5.21 10*6/MM3 (ref 4.14–5.8)
SODIUM SERPL-SCNC: 140 MMOL/L (ref 136–145)
TRIGL SERPL-MCNC: 195 MG/DL (ref 0–150)
TSH SERPL DL<=0.005 MIU/L-ACNC: 1.52 UIU/ML (ref 0.27–4.2)
VLDLC SERPL CALC-MCNC: 36 MG/DL (ref 5–40)
WBC # BLD AUTO: 5.76 10*3/MM3 (ref 3.4–10.8)

## 2024-12-30 DIAGNOSIS — K21.9 GASTROESOPHAGEAL REFLUX DISEASE, UNSPECIFIED WHETHER ESOPHAGITIS PRESENT: ICD-10-CM

## 2024-12-30 DIAGNOSIS — F41.8 ANXIETY WITH DEPRESSION: ICD-10-CM

## 2024-12-30 RX ORDER — ESCITALOPRAM OXALATE 10 MG/1
10 TABLET ORAL DAILY
Qty: 90 TABLET | Refills: 0 | Status: SHIPPED | OUTPATIENT
Start: 2024-12-30

## 2024-12-30 RX ORDER — FAMOTIDINE 20 MG/1
20 TABLET, FILM COATED ORAL 2 TIMES DAILY PRN
Qty: 180 TABLET | Refills: 0 | Status: SHIPPED | OUTPATIENT
Start: 2024-12-30

## 2024-12-30 NOTE — TELEPHONE ENCOUNTER
Rx Refill Note  Requested Prescriptions     Pending Prescriptions Disp Refills    escitalopram (LEXAPRO) 10 MG tablet 90 tablet 3     Sig: Take 1 tablet by mouth Daily.      Last office visit with prescribing clinician: 11/4/2024   Last telemedicine visit with prescribing clinician: Visit date not found   Next office visit with prescribing clinician: 11/5/2025

## 2024-12-30 NOTE — TELEPHONE ENCOUNTER
Rx Refill Note  Requested Prescriptions     Pending Prescriptions Disp Refills    famotidine (Pepcid) 20 MG tablet 180 tablet 0     Sig: Take 1 tablet by mouth 2 (Two) Times a Day As Needed for Heartburn. Take 1 hour before or 2 hours after meals      Last office visit with prescribing clinician: 11/4/2024   Last telemedicine visit with prescribing clinician: Visit date not found   Next office visit with prescribing clinician: 11/5/2025                         Would you like a call back once the refill request has been completed: [] Yes [] No    If the office needs to give you a call back, can they leave a voicemail: [] Yes [] No    Erlinda Romero MA  12/30/24, 14:18 EST

## 2025-01-10 DIAGNOSIS — F41.8 ANXIETY WITH DEPRESSION: ICD-10-CM

## 2025-01-10 RX ORDER — ESCITALOPRAM OXALATE 10 MG/1
10 TABLET ORAL DAILY
Qty: 90 TABLET | Refills: 0 | OUTPATIENT
Start: 2025-01-10

## 2025-04-20 DIAGNOSIS — K21.9 GASTROESOPHAGEAL REFLUX DISEASE, UNSPECIFIED WHETHER ESOPHAGITIS PRESENT: ICD-10-CM

## 2025-04-20 DIAGNOSIS — F41.8 ANXIETY WITH DEPRESSION: ICD-10-CM

## 2025-04-21 RX ORDER — FAMOTIDINE 20 MG/1
20 TABLET, FILM COATED ORAL 2 TIMES DAILY PRN
Qty: 180 TABLET | Refills: 0 | Status: SHIPPED | OUTPATIENT
Start: 2025-04-21

## 2025-04-21 RX ORDER — ESCITALOPRAM OXALATE 10 MG/1
10 TABLET ORAL DAILY
Qty: 90 TABLET | Refills: 0 | Status: SHIPPED | OUTPATIENT
Start: 2025-04-21

## 2025-04-21 NOTE — TELEPHONE ENCOUNTER
Dr Trevino's patient    Rx Refill Note  Requested Prescriptions     Pending Prescriptions Disp Refills    escitalopram (LEXAPRO) 10 MG tablet [Pharmacy Med Name: ESCITALOPRAM 10MG TABLETS] 90 tablet 0     Sig: TAKE 1 TABLET BY MOUTH DAILY      Last office visit with prescribing clinician: 11/04/2024  Last telemedicine visit with prescribing clinician: Visit date not found   Next office visit with prescribing clinician: 11/05/2025

## 2025-04-21 NOTE — TELEPHONE ENCOUNTER
Rx Refill Note  Requested Prescriptions     Pending Prescriptions Disp Refills    escitalopram (LEXAPRO) 10 MG tablet [Pharmacy Med Name: ESCITALOPRAM 10MG TABLETS] 90 tablet 0     Sig: TAKE 1 TABLET BY MOUTH DAILY    famotidine (Pepcid) 20 MG tablet 180 tablet 0     Sig: Take 1 tablet by mouth 2 (Two) Times a Day As Needed for Heartburn. Take 1 hour before or 2 hours after meals      Last office visit with prescribing clinician: 11/4/24  Last telemedicine visit with prescribing clinician: Visit date not found   Next office visit with prescribing clinician: 11/5/25                        Would you like a call back once the refill request has been completed: [] Yes [] No    If the office needs to give you a call back, can they leave a voicemail: [] Yes [] No    Erlinda Romero MA  04/21/25, 09:39 EDT

## 2025-07-02 DIAGNOSIS — K21.9 GASTROESOPHAGEAL REFLUX DISEASE, UNSPECIFIED WHETHER ESOPHAGITIS PRESENT: ICD-10-CM

## 2025-07-02 RX ORDER — FAMOTIDINE 20 MG/1
TABLET, FILM COATED ORAL
Qty: 180 TABLET | Refills: 0 | Status: SHIPPED | OUTPATIENT
Start: 2025-07-02

## 2025-07-02 NOTE — TELEPHONE ENCOUNTER
Rx Refill Note  Requested Prescriptions     Pending Prescriptions Disp Refills    famotidine (PEPCID) 20 MG tablet [Pharmacy Med Name: FAMOTIDINE 20MG TABLETS] 180 tablet 0     Sig: TAKE 1 TABLET BY MOUTH TWICE DAILY AS NEEDED FOR HEARTBURN- TAKE 1 HOUR BEFORE OR 2 HOURS AFTER MEALS      Last office visit with prescribing clinician: 11/04/2024  Last telemedicine visit with prescribing clinician: Visit date not found   Next office visit with prescribing clinician: 11/05/2025

## 2025-07-21 DIAGNOSIS — F41.8 ANXIETY WITH DEPRESSION: ICD-10-CM

## 2025-07-21 RX ORDER — ESCITALOPRAM OXALATE 10 MG/1
10 TABLET ORAL DAILY
Qty: 90 TABLET | Refills: 0 | OUTPATIENT
Start: 2025-07-21

## 2025-07-21 NOTE — TELEPHONE ENCOUNTER
Caller: JavierWild    Relationship: Self    Best call back number: [phone]     Requested Prescriptions:   Requested Prescriptions     Pending Prescriptions Disp Refills    escitalopram (LEXAPRO) 10 MG tablet 90 tablet 0     Sig: Take 1 tablet by mouth Daily.        [id number] [place] [address] [place] [facility] [place] [facility] [address] [facility] - [phone] [facility] [phone] FX     Last office visit with prescribing clinician: 11/4/2024   Last telemedicine visit with prescribing clinician: Visit date not found   Next office visit with prescribing clinician: 11/5/2025     Additional details provided by patient:     Does the patient have less than a 3 day supply:  [] Yes  [x] No    Would you like a call back once the refill request has been completed: [] Yes [] No    If the office needs to give you a call back, can they leave a voicemail: [] Yes [] No    Mina Odom Rep   07/21/25 15:05 EDT

## (undated) DEVICE — LAPAROSCOPIC SMOKE FILTRATION SYSTEM: Brand: PALL LAPAROSHIELD® PLUS LAPAROSCOPIC SMOKE FILTRATION SYSTEM

## (undated) DEVICE — VISUALIZATION SYSTEM: Brand: CLEARIFY

## (undated) DEVICE — OVAL SHAPE BALLOON: Brand: EXTRA VIEW

## (undated) DEVICE — APPL CHLORAPREP HI/LITE 26ML ORNG

## (undated) DEVICE — ADHS SKIN SURG TISS VISC PREMIERPRO EXOFIN HI/VISC FAST/DRY

## (undated) DEVICE — NDL HYPO ECLPS SFTY 22G 1 1/2IN

## (undated) DEVICE — GLV SURG BIOGEL M LTX PF 6 1/2

## (undated) DEVICE — SUT VIC 5/0 PS2 18IN J495H

## (undated) DEVICE — BLUNT TIP TROCAR: Brand: AUTO SUTURE

## (undated) DEVICE — TROC SYS CANN HERN EZ PRT

## (undated) DEVICE — SUT VIC 0 TN 27IN DYED JTN0G

## (undated) DEVICE — LOU LAP CHOLE: Brand: MEDLINE INDUSTRIES, INC.

## (undated) DEVICE — DISPOSABLE MONOPOLAR ENDOSCOPIC CORD 10 FT. (3M): Brand: KIRWAN